# Patient Record
Sex: FEMALE | Race: WHITE | URBAN - METROPOLITAN AREA
[De-identification: names, ages, dates, MRNs, and addresses within clinical notes are randomized per-mention and may not be internally consistent; named-entity substitution may affect disease eponyms.]

---

## 2017-02-20 ENCOUNTER — OFFICE VISIT (OUTPATIENT)
Dept: FAMILY MEDICINE CLINIC | Age: 47
End: 2017-02-20

## 2017-02-20 VITALS
TEMPERATURE: 98.1 F | HEIGHT: 66 IN | SYSTOLIC BLOOD PRESSURE: 110 MMHG | DIASTOLIC BLOOD PRESSURE: 78 MMHG | HEART RATE: 74 BPM | WEIGHT: 161 LBS | BODY MASS INDEX: 25.88 KG/M2 | OXYGEN SATURATION: 99 %

## 2017-02-20 DIAGNOSIS — I73.9 PVD (PERIPHERAL VASCULAR DISEASE) (HCC): ICD-10-CM

## 2017-02-20 DIAGNOSIS — M54.2 CERVICALGIA: Primary | ICD-10-CM
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
## 2017-02-20 DIAGNOSIS — E04.1 THYROID NODULE: ICD-10-CM

## 2017-02-20 PROCEDURE — 99213 OFFICE O/P EST LOW 20 MIN: CPT | Performed by: FAMILY MEDICINE

## 2017-02-20 RX ORDER — TRIAMTERENE AND HYDROCHLOROTHIAZIDE 37.5; 25 MG/1; MG/1
1 CAPSULE ORAL DAILY
Qty: 30 CAPSULE | Refills: 3 | Status: SHIPPED | OUTPATIENT
Start: 2017-02-20

## 2017-02-20 RX ORDER — NAPROXEN 500 MG/1
500 TABLET ORAL 2 TIMES DAILY WITH MEALS
Qty: 60 TABLET | Refills: 3 | Status: SHIPPED | OUTPATIENT
Start: 2017-02-20 | End: 2018-02-20

## 2017-02-20 RX ORDER — METHYLPREDNISOLONE 4 MG/1
TABLET ORAL
Qty: 1 KIT | Refills: 0 | Status: SHIPPED | OUTPATIENT
Start: 2017-02-20

## 2017-02-20 RX ORDER — METHOCARBAMOL 750 MG/1
750 TABLET, FILM COATED ORAL 4 TIMES DAILY
Qty: 40 TABLET | Refills: 0 | Status: SHIPPED | OUTPATIENT
Start: 2017-02-20 | End: 2017-03-02

## 2017-02-21 ASSESSMENT — ENCOUNTER SYMPTOMS
GASTROINTESTINAL NEGATIVE: 1
RESPIRATORY NEGATIVE: 1

## 2017-02-22 DIAGNOSIS — E04.1 THYROID NODULE: Primary | ICD-10-CM

## 2017-02-22 RX ORDER — BROMPHENIRAMINE MALEATE, PSEUDOEPHEDRINE HYDROCHLORIDE, AND DEXTROMETHORPHAN HYDROBROMIDE 2; 30; 10 MG/5ML; MG/5ML; MG/5ML
5 SYRUP ORAL 4 TIMES DAILY PRN
Qty: 240 ML | Refills: 0 | COMMUNITY
Start: 2017-02-22

## 2017-02-22 RX ORDER — BROMPHENIRAMINE MALEATE, PSEUDOEPHEDRINE HYDROCHLORIDE, AND DEXTROMETHORPHAN HYDROBROMIDE 2; 30; 10 MG/5ML; MG/5ML; MG/5ML
5 SYRUP ORAL 4 TIMES DAILY PRN
Qty: 240 ML | Refills: 0 | COMMUNITY
Start: 2017-02-22 | End: 2017-02-22 | Stop reason: SDUPTHER

## 2017-02-22 RX ORDER — CEFDINIR 300 MG/1
300 CAPSULE ORAL 2 TIMES DAILY
Qty: 20 CAPSULE | Refills: 0 | Status: SHIPPED | OUTPATIENT
Start: 2017-02-22 | End: 2017-03-04

## 2017-02-22 RX ORDER — CEFDINIR 300 MG/1
300 CAPSULE ORAL 2 TIMES DAILY
Qty: 20 CAPSULE | Refills: 0 | Status: SHIPPED | OUTPATIENT
Start: 2017-02-22 | End: 2017-02-22 | Stop reason: SDUPTHER

## 2017-02-24 ENCOUNTER — TELEPHONE (OUTPATIENT)
Dept: FAMILY MEDICINE CLINIC | Age: 47
End: 2017-02-24

## 2017-02-27 DIAGNOSIS — E04.1 THYROID NODULE: Primary | ICD-10-CM

## 2020-06-10 ENCOUNTER — HOSPITAL ENCOUNTER (OUTPATIENT)
Age: 50
Setting detail: SPECIMEN
Discharge: HOME | End: 2020-06-10
Payer: OTHER GOVERNMENT

## 2020-06-10 ENCOUNTER — OFFICE VISIT (OUTPATIENT)
Dept: PRIMARY CARE CLINIC | Age: 50
End: 2020-06-10

## 2020-06-10 PROCEDURE — 99211 OFF/OP EST MAY X REQ PHY/QHP: CPT | Performed by: FAMILY MEDICINE

## 2020-06-10 PROCEDURE — U0002 COVID-19 LAB TEST NON-CDC: HCPCS

## 2020-06-11 LAB
SARS-COV-2: NOT DETECTED
SOURCE: NORMAL

## 2020-06-19 VITALS — TEMPERATURE: 98.1 F | HEART RATE: 74 BPM | OXYGEN SATURATION: 99 %

## 2020-08-28 ENCOUNTER — HOSPITAL ENCOUNTER (OUTPATIENT)
Dept: MAMMOGRAPHY | Age: 50
Discharge: HOME | End: 2020-08-28
Payer: COMMERCIAL

## 2020-08-28 PROCEDURE — 77063 BREAST TOMOSYNTHESIS BI: CPT

## 2020-12-04 ENCOUNTER — HOSPITAL ENCOUNTER (OUTPATIENT)
Age: 50
Setting detail: SPECIMEN
Discharge: HOME | End: 2020-12-04

## 2020-12-04 ENCOUNTER — OFFICE VISIT (OUTPATIENT)
Dept: PRIMARY CARE CLINIC | Age: 50
End: 2020-12-04

## 2020-12-04 PROCEDURE — 99213 OFFICE O/P EST LOW 20 MIN: CPT | Performed by: PHYSICIAN ASSISTANT

## 2020-12-04 PROCEDURE — U0002 COVID-19 LAB TEST NON-CDC: HCPCS

## 2020-12-04 NOTE — PROGRESS NOTES
12/4/20  Demi Leal  1970    FLU/COVID-19 CLINIC EVALUATION    HPI SYMPTOMS:    Employer:    [] Fevers  [] Chills  [x] Cough  [] Coughing up blood  [] Chest Congestion  [x] Nasal Congestion  [x] Feeling short of breath  [x] Sometimes  [] Frequently  [] All the time  [] Chest pain  [x] Headaches  []Tolerable  [] Severe  [x] Sore throat  [] Muscle aches  [] Nausea  [] Vomiting  []Unable to keep fluids down  [] Diarrhea  []Severe    [] OTHER SYMPTOMS:      Symptom Duration:   [x] 1  [] 2   [] 3   [] 4    [] 5   [] 6   [] 7   [] 8   [] 9   [] 10   [] 11   [] 12   [] 13   [] 14   [] Longer than 14 days    Symptom course:   [x] Worsening     [] Stable     [] Improving    RISK FACTORS:    [] Pregnant or possibly pregnant  [] Age over 61  [] Diabetes  [] Heart disease  [] Asthma  [] COPD/Other chronic lung diseases  [] Active Cancer  [] On Chemotherapy  [] Taking oral steroids  [] History Lymphoma/Leukemia  [] Close contact with a lab confirmed COVID-19 patient within 14 days of symptom onset  [] History of travel from affected geographical areas within 14 days of symptom onset       VITALS:  There were no vitals filed for this visit. TESTS:    POCT FLU:  [] Positive     []Negative    ASSESSMENT:    [] Flu  [] Possible COVID-19  [] Strep    PLAN:    [] Discharge home with written instructions for:  [] Flu management  [] Possible COVID-19 infection with self-quarantine and management of symptoms  [] Follow-up with primary care physician or emergency department if worsens  [] Evaluation per physician/NP/PA in clinic  [] Sent to ER       An  electronic signature was used to authenticate this note.      --Sukh Sanderson LPN on 26/8/2949 at 4:74 PM

## 2020-12-04 NOTE — PROGRESS NOTES
12/4/2020    HPI:  Chief complaint and history of present illness as per medical assistant/nurse documented today in the Flu/COVID-19 clinic. Patient states she woke up this morning with postnasal drip, runny nose, cough, shortness of breath, headache, and sore throat. She denies stridor, wheezing, chest pain, fever, chills, muscle aches, nausea, vomiting, diarrhea. She has taken Excedrin and it has relieved her headache. She denies worst headache of her life. She denies neck pain or stiffness, lightheadedness, dizziness, light sensitivity or vision changes. She works for [employer]. No known contact with COVID-19. There are other ill contacts in her workplace who have yet to be tested for Covid. MEDICATIONS:  Prior to Visit Medications    Medication Sig Taking? Authorizing Provider   dicyclomine (BENTYL) 10 MG capsule Take 1 capsule by mouth 4 times daily (before meals and nightly)  Chuckie Euceda [address] [place] [address], BETH   brompheniramine-pseudoephedrine-DM (BROMFED DM) 30-2-10 MG/5ML syrup Take 5 mLs by mouth 4 times daily as needed for Congestion or Cough  [facility]   naproxen (EC-NAPROSYN) 500 MG EC tablet Take 1 tablet by mouth 2 times daily (with meals)  [facility]   methylPREDNISolone (MEDROL, WILLIAM,) 4 MG tablet Take as directed on the pack  [facility] PA-C   triamterene-hydrochlorothiazide (DYAZIDE) 37.5-25 MG per capsule Take 1 capsule by mouth daily  [facility] PA-C   Compression Stockings MISC by Does not apply route  [facility]   estradiol (VIVELLE-DOT) 0.1 MG/24HR Place 2 patches onto the skin twice a week. Historical Provider, MD   ibuprofen (ADVIL) 200 MG CAPS Take 1 capsule by mouth as needed.     Historical Provider, MD       Allergies   Allergen Reactions    Tequin [Gatifloxacin-D5w] Other (See Comments)     Reaction was hypoglycemia   ,   Past Medical History:   Diagnosis Date    Edema     Hands, Lower Extremities frequently    Nausea & vomiting Severe    Swelling, lymph nodes 10/11    Lymphadema bilateral neck       PHYSICAL EXAM:  Physical Exam  Temp:  97.6 F  Heart Rate:  87    Pulse Ox:  98%    Constitutional:  Well developed, well nourished  HENT:  Normocephalic, atraumatic, bilateral external ears normal, bilateral TMs normal, oropharynx moist, post nasal drip noted, no petechiae or exudate, airway patent, sinuses nontender, clear rhinorrhea  Eyes:  conjunctiva normal, no discharge, no scleral icterus  Cardiovascular:  Normal heart rate, normal rhythm, no murmurs, gallops or rubs  Thorax & Lungs:  Normal breath sounds, no respiratory distress, no wheezing  Skin:  Warm, dry, no erythema, no rash  Neurologic:  Alert & oriented   Psychiatric:  Affect normal, mood normal    ASSESSMENT/PLAN:  1. Flu-like symptoms  - COVID-19 Ambulatory    Patient was encouraged to rest and stay well-hydrated. Gargle warm salt water as needed. Continue over-the-counter medications as needed for symptom relief. We discussed my chart and the importance of isolating at home. She understands she should go to the emergency room for any sudden changes. FOLLOW-UP:  No follow-ups on file.      In addition to other information, the printed after visit summary provided to the patient includes:  [x] COVID-19 Self care instructions  [x] COVID-19 General patient information    [facility]

## 2020-12-07 LAB
SARS-COV-2: DETECTED
SOURCE: ABNORMAL

## 2020-12-16 ENCOUNTER — APPOINTMENT (OUTPATIENT)
Dept: CT IMAGING | Age: 50
End: 2020-12-16

## 2020-12-16 ENCOUNTER — HOSPITAL ENCOUNTER (EMERGENCY)
Age: 50
Discharge: HOME | End: 2020-12-16
Attending: EMERGENCY MEDICINE

## 2020-12-16 VITALS
DIASTOLIC BLOOD PRESSURE: 79 MMHG | BODY MASS INDEX: 24.43 KG/M2 | RESPIRATION RATE: 12 BRPM | TEMPERATURE: 98.1 F | OXYGEN SATURATION: 98 % | WEIGHT: 152 LBS | SYSTOLIC BLOOD PRESSURE: 109 MMHG | HEART RATE: 70 BPM | HEIGHT: 66 IN

## 2020-12-16 LAB
ALBUMIN SERPL-MCNC: 4 GM/DL (ref 3.4–5)
ALP BLD-CCNC: 79 IU/L (ref 40–129)
ALT SERPL-CCNC: 19 U/L (ref 10–40)
ANION GAP SERPL CALCULATED.3IONS-SCNC: 10 MMOL/L (ref 4–16)
AST SERPL-CCNC: 18 IU/L (ref 15–37)
BASE EXCESS MIXED: 2.8 (ref 0–2.3)
BASOPHILS ABSOLUTE: 0.1 K/CU MM
BASOPHILS RELATIVE PERCENT: 0.7 % (ref 0–1)
BILIRUB SERPL-MCNC: 0.2 MG/DL (ref 0–1)
BUN BLDV-MCNC: 17 MG/DL (ref 6–23)
CALCIUM SERPL-MCNC: 9.3 MG/DL (ref 8.3–10.6)
CHLORIDE BLD-SCNC: 96 MMOL/L (ref 99–110)
CO2: 25 MMOL/L (ref 21–32)
COMMENT: ABNORMAL
CREAT SERPL-MCNC: 0.6 MG/DL (ref 0.6–1.1)
D DIMER: <200 NG/ML(DDU)
DIFFERENTIAL TYPE: ABNORMAL
EOSINOPHILS ABSOLUTE: 0.2 K/CU MM
EOSINOPHILS RELATIVE PERCENT: 2.2 % (ref 0–3)
GFR AFRICAN AMERICAN: >60 ML/MIN/1.73M2
GFR NON-AFRICAN AMERICAN: >60 ML/MIN/1.73M2
GLUCOSE BLD-MCNC: 94 MG/DL (ref 70–99)
HCG QUALITATIVE: NEGATIVE
HCO3 VENOUS: 27.2 MMOL/L (ref 19–25)
HCT VFR BLD CALC: 48.1 % (ref 37–47)
HEMOGLOBIN: 15.6 GM/DL (ref 12.5–16)
IMMATURE NEUTROPHIL %: 0.4 % (ref 0–0.43)
LACTATE: 1 MMOL/L (ref 0.4–2)
LYMPHOCYTES ABSOLUTE: 1.9 K/CU MM
LYMPHOCYTES RELATIVE PERCENT: 28.4 % (ref 24–44)
MCH RBC QN AUTO: 28.2 PG (ref 27–31)
MCHC RBC AUTO-ENTMCNC: 32.4 % (ref 32–36)
MCV RBC AUTO: 87 FL (ref 78–100)
MONOCYTES ABSOLUTE: 0.5 K/CU MM
MONOCYTES RELATIVE PERCENT: 7 % (ref 0–4)
NUCLEATED RBC %: 0 %
O2 SAT, VEN: 95 % (ref 50–70)
PCO2, VEN: 40 MMHG (ref 38–52)
PDW BLD-RTO: 12 % (ref 11.7–14.9)
PH VENOUS: 7.44 (ref 7.32–7.42)
PLATELET # BLD: 307 K/CU MM (ref 140–440)
PMV BLD AUTO: 10.6 FL (ref 7.5–11.1)
PO2, VEN: 102 MMHG (ref 28–48)
POTASSIUM SERPL-SCNC: 3.7 MMOL/L (ref 3.5–5.1)
PRO-BNP: 15.16 PG/ML
RBC # BLD: 5.53 M/CU MM (ref 4.2–5.4)
SEGMENTED NEUTROPHILS ABSOLUTE COUNT: 4.1 K/CU MM
SEGMENTED NEUTROPHILS RELATIVE PERCENT: 61.3 % (ref 36–66)
SODIUM BLD-SCNC: 131 MMOL/L (ref 135–145)
TOTAL IMMATURE NEUTOROPHIL: 0.03 K/CU MM
TOTAL NUCLEATED RBC: 0 K/CU MM
TOTAL PROTEIN: 7.1 GM/DL (ref 6.4–8.2)
TROPONIN T: <0.01 NG/ML
WBC # BLD: 6.7 K/CU MM (ref 4–10.5)

## 2020-12-16 PROCEDURE — 85379 FIBRIN DEGRADATION QUANT: CPT

## 2020-12-16 PROCEDURE — 70450 CT HEAD/BRAIN W/O DYE: CPT

## 2020-12-16 PROCEDURE — 93005 ELECTROCARDIOGRAM TRACING: CPT | Performed by: EMERGENCY MEDICINE

## 2020-12-16 PROCEDURE — 84703 CHORIONIC GONADOTROPIN ASSAY: CPT

## 2020-12-16 PROCEDURE — 83880 ASSAY OF NATRIURETIC PEPTIDE: CPT

## 2020-12-16 PROCEDURE — 2580000003 HC RX 258: Performed by: EMERGENCY MEDICINE

## 2020-12-16 PROCEDURE — 83605 ASSAY OF LACTIC ACID: CPT

## 2020-12-16 PROCEDURE — 96374 THER/PROPH/DIAG INJ IV PUSH: CPT

## 2020-12-16 PROCEDURE — 99284 EMERGENCY DEPT VISIT MOD MDM: CPT

## 2020-12-16 PROCEDURE — 80053 COMPREHEN METABOLIC PANEL: CPT

## 2020-12-16 PROCEDURE — 82805 BLOOD GASES W/O2 SATURATION: CPT

## 2020-12-16 PROCEDURE — 84484 ASSAY OF TROPONIN QUANT: CPT

## 2020-12-16 PROCEDURE — 96375 TX/PRO/DX INJ NEW DRUG ADDON: CPT

## 2020-12-16 PROCEDURE — 6360000002 HC RX W HCPCS: Performed by: EMERGENCY MEDICINE

## 2020-12-16 PROCEDURE — 85025 COMPLETE CBC W/AUTO DIFF WBC: CPT

## 2020-12-16 RX ORDER — DIPHENHYDRAMINE HYDROCHLORIDE 50 MG/ML
25 INJECTION INTRAMUSCULAR; INTRAVENOUS ONCE
Status: COMPLETED | OUTPATIENT
Start: 2020-12-16 | End: 2020-12-16

## 2020-12-16 RX ORDER — 0.9 % SODIUM CHLORIDE 0.9 %
1000 INTRAVENOUS SOLUTION INTRAVENOUS ONCE
Status: COMPLETED | OUTPATIENT
Start: 2020-12-16 | End: 2020-12-16

## 2020-12-16 RX ORDER — ONDANSETRON 4 MG/1
4 TABLET, ORALLY DISINTEGRATING ORAL 3 TIMES DAILY PRN
Qty: 21 TABLET | Refills: 0 | Status: SHIPPED | OUTPATIENT
Start: 2020-12-16

## 2020-12-16 RX ORDER — DIPHENHYDRAMINE HCL 25 MG
25 CAPSULE ORAL EVERY 6 HOURS PRN
Qty: 30 CAPSULE | Refills: 0 | Status: SHIPPED | OUTPATIENT
Start: 2020-12-16 | End: 2020-12-26

## 2020-12-16 RX ORDER — METOCLOPRAMIDE HYDROCHLORIDE 5 MG/ML
10 INJECTION INTRAMUSCULAR; INTRAVENOUS ONCE
Status: COMPLETED | OUTPATIENT
Start: 2020-12-16 | End: 2020-12-16

## 2020-12-16 RX ADMIN — METOCLOPRAMIDE 10 MG: 5 INJECTION, SOLUTION INTRAMUSCULAR; INTRAVENOUS at 18:18

## 2020-12-16 RX ADMIN — DIPHENHYDRAMINE HYDROCHLORIDE 25 MG: 50 INJECTION INTRAMUSCULAR; INTRAVENOUS at 18:18

## 2020-12-16 RX ADMIN — SODIUM CHLORIDE 1000 ML: 9 INJECTION, SOLUTION INTRAVENOUS at 18:21

## 2020-12-16 ASSESSMENT — PAIN SCALES - GENERAL: PAINLEVEL_OUTOF10: 7

## 2020-12-16 ASSESSMENT — PAIN DESCRIPTION - LOCATION: LOCATION: HEAD;CHEST

## 2020-12-16 ASSESSMENT — PAIN DESCRIPTION - PAIN TYPE: TYPE: ACUTE PAIN

## 2020-12-16 NOTE — ED PROVIDER NOTES
Emergency Department Encounter    Patient: Errol Junior  MRN: [id number]  : 1970  Date of Evaluation: 2020  ED Provider:  JONE ROBLES    Triage Chief Complaint:   Nausea, Emesis, Shortness of Breath, and Migraine      Lummi:  Errol Junior is a 53 y.o. female that presents to the emergency department with a constellation of symptoms beginning with a \"migraine\" yesterday. Sometime yesterday afternoon, patient had the abrupt onset and progression of a frontal and generalized headache. She thinks it was an \"ocular migraine,\" because she lost vision out of the right eye briefly. Vision has returned, but the headache persists. She does report light and sound sensitivity. She has been very nauseated. She denies any recent vomiting or diarrhea. Patient reports a history of TIA in 2020, though that involved symptoms of the right arm and leg. She has had no extremity symptoms with this current episode. Patient states her symptoms is much more consistent with episodes of low potassium and calcium. She tried eating a banana, but this has not helped. Patient was tested positive for COVID-19 on 2020. She reports some sharp discomfort just below the left collarbone with inspiration. There is a persistent dry cough. She denies other chest pain. She has been managing fairly well at home otherwise. Patient reports no other particular provocative or alleviating factors. ROS - see HPI, below listed is current ROS at time of my eval:  CONSTITUTIONAL: No fevers, chills, or sweats. EYES: No vision change, redness, drainage, or discharge. HENT: No sore throat, runny nose, or earache. No dental pain. No painful swallowing. RESPIRATORY: No difficulty breathing, cough, or sputum production. CARDIOVASCULAR: No anginal-type chest pain, orthopnea, or edema. GASTROINTESTINAL: No abdominal pain. No diarrhea or constipation. No hematemesis, hematochezia, or melena.   GENITOURINARY: No frequency, urgency, or dysuria. No hematuria. MUSCULOSKELETAL: No recent injury. No neck, back, or extremity pain. NEUROLOGICAL: No focal weakness, numbness, or tingling. SKIN: No rashes or other lesions reported. No yellowing of the skin.       Past Medical History:   Diagnosis Date    Edema     Hands, Lower Extremities frequently    Hypokalemia 06/2020    Nausea & vomiting     Severe    Swelling, lymph nodes 10/11    Lymphadema bilateral neck    TIA (transient ischemic attack) 06/2020     Past Surgical History:   Procedure Laterality Date    ABDOMEN SURGERY  1986    Exploratory Laparotomy    CHOLECYSTECTOMY      DENTAL SURGERY  1978    Caps On Teeth    DILATION AND CURETTAGE OF UTERUS      X 4 in Past, Last One 2007    HERNIA REPAIR      HYSTERECTOMY  8/11    Robotic Lap Total Hysterectomy    LAPAROSCOPY      X3 Last one 2007, laser     TONSILLECTOMY  1977    T & A    TUBAL LIGATION  2007     Family History   Problem Relation Age of Onset    Arthritis Mother     Hearing Loss Mother     High Blood Pressure Mother     High Cholesterol Mother     Other Mother 48        Subdurmal Hematoma    Heart Disease Mother         CAD    Cancer Father 61        Renal Cell     Diabetes Father     High Cholesterol Father     Asthma Son     Other Son         GERD    Asthma Son     Other Son         Bronchomalacia Tracheomalacia     Social History     Socioeconomic History    Marital status:      Spouse name: Davi Armando Number of children: 2    Years of education: Not on file    Highest education level: Not on file   Occupational History    Occupation: LPN   Social Needs    Financial resource strain: Not on file    Food insecurity     Worry: Not on file     Inability: Not on file    Transportation needs     Medical: Not on file     Non-medical: Not on file   Tobacco Use    Smoking status: Former Smoker     Quit date: 4/4/2005     Years since quitting: 15.7    Smokeless tobacco: Never Used   Substance and Sexual Activity    Alcohol use: Yes     Comment: socially    Drug use: No    Sexual activity: Not on file   Lifestyle    Physical activity     Days per week: Not on file     Minutes per session: Not on file    Stress: Not on file   Relationships    Social connections     Talks on phone: Not on file     Gets together: Not on file     Attends Synagogue service: Not on file     Active member of club or organization: Not on file     Attends meetings of clubs or organizations: Not on file     Relationship status: Not on file    Intimate partner violence     Fear of current or ex partner: Not on file     Emotionally abused: Not on file     Physically abused: Not on file     Forced sexual activity: Not on file   Other Topics Concern    Not on file   Social History Narrative    Do you donate blood or plasma? No    Caffeine intake? 2 cups daily    Advance directive? No    Is blood transfusion acceptable in an emergency? Yes             No current facility-administered medications for this encounter.       Current Outpatient Medications   Medication Sig Dispense Refill    ondansetron (ZOFRAN-ODT) 4 MG disintegrating tablet Take 1 tablet by mouth 3 times daily as needed for Nausea or Vomiting (Headache) 21 tablet 0    diphenhydrAMINE (BENADRYL ALLERGY) 25 MG capsule Take 1 capsule by mouth every 6 hours as needed for Itching (Headache, nausea and vomiting) 30 capsule 0    dicyclomine (BENTYL) 10 MG capsule Take 1 capsule by mouth 4 times daily (before meals and nightly) 15 capsule 3    brompheniramine-pseudoephedrine-DM (BROMFED DM) 30-2-10 MG/5ML syrup Take 5 mLs by mouth 4 times daily as needed for Congestion or Cough 240 mL 0    naproxen (EC-NAPROSYN) 500 MG EC tablet Take 1 tablet by mouth 2 times daily (with meals) 60 tablet 3    methylPREDNISolone (MEDROL, WILLIAM,) 4 MG tablet Take as directed on the pack 1 kit 0    triamterene-hydrochlorothiazide (DYAZIDE) 37.5-25 MG per capsule Take 1 tenderness. CARDIOVASCULAR: Regular rate and rhythm. No audible murmurs, rubs, or gallops. No central or peripheral cyanosis. GASTROINTESTINAL: Soft, nontender, and nondistended. No McBurney's or Sanchez's point tenderness. No guarding, rebound, rigidity. No mass or pulsatile mass. Bowel sounds are present in all quadrants. No costovertebral angle tenderness. NEUROLOGICAL: Cranial nerves III through XII are grossly intact as tested without facial droop or dermatomal paresthesias. Of note, forehead wrinkles are symmetric and intact. Conjugate gaze without entrapment. No asymmetry of the corners of the mouth or nasolabial folds. Motor exhibits 5/5 strength in the bilateral trapezius, biceps, triceps, handgrip, quadriceps, psoas, dorsiflexors, and plantar flexors. No gross dermatomal paresthesias. No gross deficits of the cerebellum. MUSCULOSKELETAL: No asymmetric edema, Homans' sign, or cords. No tenderness or limitation range of motion to the bilateral shoulders, elbows, wrists, hips, knees, or ankles. No accompanying long bone tenderness or deformity. SKIN: Normal tone for ethnicity. Normal turgor and brisk capillary refill peripherally. No petechiae, purpura, vesicles, bullae, or other lesions. No icterus. PSYCHIATRIC: Normal mood. Normal affect. No voiced suicidal or homicidal ideation. Patient does not respond to internal stimuli. Emergency department course. Patient is brought to bed 14 and assessed and reassessed by me. Prior to initial evaluation, orders are placed for medical screening studies including CBC, metabolic panel, troponin, first lactate, and venous blood gas. Patient is noted to have been Covid positive on December 4, 2020. IV line is requested along with metoclopramide 10 mg and diphenhydramine 25 mg. After initial evaluation, additional orders are placed for CT of the head due to the persistent headache.   Patient exhibits no meningeal findings or focal neurological deficits. NIH stroke scale is 0. She is saturating at 95% on room air. Patient is agreeable to continuing plan. Upon most recent reevaluation, patient remains clinically stable. Headache remains, though it has improved. There are no developing meningeal findings or focal neurological deficits. There has been no respiratory distress, hypoxia, or cyanosis. As she has not had specific worsening or respiratory complaints, further imaging is deferred. We have discussed options for disposition, and with a generally reassuring evaluation, further outpatient management appears reasonable. Patient would like to be discharged home, as well. We have discussed all available results. Patient is satisfied with evaluation and agreeable to recommendations. Patient has had the opportunity to ask questions, and they have been answered to the best of my ability. Instructions are given to follow-up with primary care provider for reevaluation and further testing. Very strict return and follow-up instructions are provided. Patient seen during [facility], I did don appropriate PPE during my encounters with the patient, including n95 (when appropriate) mask and eye protection as appropriate.     I have reviewed and interpreted all of the currently available lab results from this visit (if applicable):  Results for orders placed or performed during the hospital encounter of 12/16/20   CBC Auto Differential   Result Value Ref Range    WBC 6.7 4.0 - 10.5 K/CU MM    RBC 5.53 (H) 4.2 - 5.4 M/CU MM    Hemoglobin 15.6 12.5 - 16.0 GM/DL    Hematocrit 48.1 (H) 37 - 47 %    MCV 87.0 78 - 100 FL    MCH 28.2 27 - 31 PG    MCHC 32.4 32.0 - 36.0 %    RDW 12.0 11.7 - 14.9 %    Platelets 686 291 - 781 K/CU MM    MPV 10.6 7.5 - 11.1 FL    Differential Type AUTOMATED DIFFERENTIAL     Segs Relative 61.3 36 - 66 %    Lymphocytes % 28.4 24 - 44 %    Monocytes % 7.0 (H) 0 - 4 %    Eosinophils % 2.2 0 - 3 %    Basophils % 0.7 0 - 1 %    Segs Absolute 4.1 K/CU MM    Lymphocytes Absolute 1.9 K/CU MM    Monocytes Absolute 0.5 K/CU MM    Eosinophils Absolute 0.2 K/CU MM    Basophils Absolute 0.1 K/CU MM    Nucleated RBC % 0.0 %    Total Nucleated RBC 0.0 K/CU MM    Total Immature Neutrophil 0.03 K/CU MM    Immature Neutrophil % 0.4 0 - 0.43 %   Comprehensive Metabolic Panel w/ Reflex to MG   Result Value Ref Range    Sodium 131 (L) 135 - 145 MMOL/L    Potassium 3.7 3.5 - 5.1 MMOL/L    Chloride 96 (L) 99 - 110 mMol/L    CO2 25 21 - 32 MMOL/L    BUN 17 6 - 23 MG/DL    CREATININE 0.6 0.6 - 1.1 MG/DL    Glucose 94 70 - 99 MG/DL    Calcium 9.3 8.3 - 10.6 MG/DL    Alb 4.0 3.4 - 5.0 GM/DL    Total Protein 7.1 6.4 - 8.2 GM/DL    Total Bilirubin 0.2 0.0 - 1.0 MG/DL    ALT 19 10 - 40 U/L    AST 18 15 - 37 IU/L    Alkaline Phosphatase 79 40 - 129 IU/L    GFR Non-African American >60 >60 mL/min/1.73m2    GFR African American >60 >60 mL/min/1.73m2    Anion Gap 10 4 - 16   Troponin   Result Value Ref Range    Troponin T <0.010 <0.01 NG/ML   Brain Natriuretic Peptide   Result Value Ref Range    Pro-BNP 15.16 <300 PG/ML   D-Dimer, Quantitative   Result Value Ref Range    D-Dimer, Quant <200 <230 NG/mL(DDU)   Blood Gas, Venous   Result Value Ref Range    pH, Carlos 7.44 (H) 7.32 - 7.42    pCO2, Carlos 40 38 - 52 mmHG    pO2, Carlos 102 (H) 28 - 48 mmHG    Base Exc, Mixed 2.8 (H) 0 - 2.3    HCO3, Venous 27.2 (H) 19 - 25 MMOL/L    O2 Sat, Carlos 95.0 (H) 50 - 70 %    Comment VBG    Lactic Acid, Plasma   Result Value Ref Range    Lactate 1.0 0.4 - 2.0 mMOL/L   HCG Qualitative, Serum   Result Value Ref Range    hCG Qual NEGATIVE    EKG 12 Lead   Result Value Ref Range    Ventricular Rate 80 BPM    Atrial Rate 80 BPM    P-R Interval 124 ms    QRS Duration 84 ms    Q-T Interval 358 ms    QTc Calculation (Bazett) 412 ms    P Axis 66 degrees    R Axis 77 degrees    T Axis 60 degrees    Diagnosis       Normal sinus rhythm  Possible Left atrial enlargement  Nonspecific T normal sinus rhythm. Rate and intervals are 80 beats per minute, WA interval 124 milliseconds, QRS duration 84 milliseconds, QTc interval 412 milliseconds, and R axis normal at the 77 degrees. There are no old EKGs available for comparison. Medical decision making:  Patient presents to the emergency department with signs and symptoms consistent with an ongoing viral syndrome associated with a recent COVID-19 diagnosis. She reports headache. There are no meningeal findings, focal neurological deficits, fevers, or behavioral changes to point towards high risk of meningitis, encephalitis, cerebritis, or subarachnoid hemorrhage. Consider lumbar puncture, but procedure has been declined after discussion of potential risks. White blood cell count, differential, and D-dimer appear quite reassuring. There has been no respiratory distress, hypoxia, and cyanosis even on room air. Abdomen has been nonsurgical.  With clinical stability and adequate saturations, admission to the hospital or further testing this evening does not appear specifically indicated. We have discussed further outpatient management, and the patient is agreeable. Procedures: None. Consultations: None. Clinical Impression:  1. Viral cephalgia    2. COVID-19 virus infection    3.  Non-intractable vomiting with nausea, unspecified vomiting type      Disposition referral (if applicable):  B[facility]ier [address]  [phone]    Schedule an appointment as soon as possible for a visit       [address] [place] [address] Emergency Department  [place] [id number] [phone]  Go to   As needed, If symptoms worsen    Disposition medications (if applicable):  Discharge Medication List as of 12/16/2020  8:09 PM      START taking these medications    Details   ondansetron (ZOFRAN-ODT) 4 MG disintegrating tablet Take 1 tablet by mouth 3 times daily as needed for Nausea or Vomiting (Headache), Disp-21 tablet, R-0Print      diphenhydrAMINE (BENADRYL ALLERGY) 25 MG capsule Take 1 capsule by mouth every 6 hours as needed for Itching (Headache, nausea and vomiting), Disp-30 capsule, R-0Print           ED Provider Disposition Time  DISPOSITION Decision To Discharge 12/16/2020 07:45:43 PM      Comment: Please note this report has been produced using speech recognition software and may contain errors related to that system including errors in grammar, punctuation, and spelling, as well as words and phrases that may be inappropriate. Efforts were made to edit the dictations.         Perla Vega MD  12/17/20 [id number]

## 2020-12-16 NOTE — ED NOTES
Bed: ED-14  Expected date:   Expected time:   Means of arrival:   Comments:  Needs alan at [facility]  12/16/20 0766

## 2020-12-17 ENCOUNTER — CARE COORDINATION (OUTPATIENT)
Dept: CARE COORDINATION | Age: 50
End: 2020-12-17

## 2020-12-17 PROCEDURE — 93010 ELECTROCARDIOGRAM REPORT: CPT | Performed by: INTERNAL MEDICINE

## 2020-12-17 NOTE — CARE COORDINATION
Call to check on pt status after recent ER visit for migraine, cough, nausea. Pt is known COVID+. LM with ACM contact information & requested a call back. CHARLIE OjedaN RN  Ambulatory Care Manager  [phone] office/cell  [phone] fax  [email or web address]

## 2020-12-18 ENCOUNTER — CARE COORDINATION (OUTPATIENT)
Dept: CARE COORDINATION | Age: 50
End: 2020-12-18

## 2020-12-18 NOTE — CARE COORDINATION
Call to check on pt status after recent ER visit for migraine, cough, nausea. Pt is known COVID+. LM with ACM contact information & requested a call back. 2nd attempt, no further outreach is planned. CHARLIE MontanoN RN  Ambulatory Care Manager  [phone] office/cell  [phone] fax  [email or web address]

## 2020-12-22 LAB
EKG ATRIAL RATE: 80 BPM
EKG DIAGNOSIS: NORMAL
EKG P AXIS: 66 DEGREES
EKG P-R INTERVAL: 124 MS
EKG Q-T INTERVAL: 358 MS
EKG QRS DURATION: 84 MS
EKG QTC CALCULATION (BAZETT): 412 MS
EKG R AXIS: 77 DEGREES
EKG T AXIS: 60 DEGREES
EKG VENTRICULAR RATE: 80 BPM

## 2022-06-02 ENCOUNTER — HOSPITAL ENCOUNTER (OUTPATIENT)
Age: 52
Setting detail: SPECIMEN
Discharge: HOME | End: 2022-06-02

## 2022-06-02 PROCEDURE — 88142 CYTOPATH C/V THIN LAYER: CPT

## 2023-06-23 ENCOUNTER — TELEPHONE (OUTPATIENT)
Dept: GASTROENTEROLOGY | Age: 53
End: 2023-06-23

## 2023-06-30 ENCOUNTER — TELEPHONE (OUTPATIENT)
Dept: GASTROENTEROLOGY | Age: 53
End: 2023-06-30

## 2023-07-03 ENCOUNTER — HOSPITAL ENCOUNTER (OUTPATIENT)
Age: 53
Discharge: HOME | End: 2023-07-03
Payer: COMMERCIAL

## 2023-07-03 ENCOUNTER — HOSPITAL ENCOUNTER (OUTPATIENT)
Dept: ULTRASOUND IMAGING | Age: 53
Discharge: HOME | End: 2023-07-03
Payer: COMMERCIAL

## 2023-07-03 ENCOUNTER — HOSPITAL ENCOUNTER (OUTPATIENT)
Dept: GENERAL RADIOLOGY | Age: 53
Discharge: HOME | End: 2023-07-03
Payer: COMMERCIAL

## 2023-07-03 ENCOUNTER — TRANSCRIBE ORDERS (OUTPATIENT)
Dept: ADMINISTRATIVE | Age: 53
End: 2023-07-03

## 2023-07-03 DIAGNOSIS — M89.8X8 MASS OF STERNUM: ICD-10-CM

## 2023-07-03 DIAGNOSIS — M89.8X8 MASS OF STERNUM: Primary | ICD-10-CM

## 2023-07-03 PROCEDURE — 71046 X-RAY EXAM CHEST 2 VIEWS: CPT

## 2023-07-03 PROCEDURE — 76604 US EXAM CHEST: CPT

## 2023-07-07 ENCOUNTER — TELEPHONE (OUTPATIENT)
Dept: GASTROENTEROLOGY | Age: 53
End: 2023-07-07

## 2023-07-07 NOTE — TELEPHONE ENCOUNTER
Called pt. For the 3rd time In regards to a referral for a colon screening.  LM for pt to call back to make an appt

## 2023-07-19 ENCOUNTER — HOSPITAL ENCOUNTER (OUTPATIENT)
Dept: ULTRASOUND IMAGING | Age: 53
Discharge: HOME | End: 2023-07-19
Payer: COMMERCIAL

## 2023-07-19 DIAGNOSIS — E04.9 THYROID ENLARGEMENT: ICD-10-CM

## 2023-07-19 PROCEDURE — 76536 US EXAM OF HEAD AND NECK: CPT

## 2023-09-07 ENCOUNTER — APPOINTMENT (OUTPATIENT)
Dept: CT IMAGING | Age: 53
DRG: 660 | End: 2023-09-07
Payer: COMMERCIAL

## 2023-09-07 ENCOUNTER — ANESTHESIA (OUTPATIENT)
Dept: OPERATING ROOM | Age: 53
End: 2023-09-07
Payer: COMMERCIAL

## 2023-09-07 ENCOUNTER — ANESTHESIA EVENT (OUTPATIENT)
Dept: OPERATING ROOM | Age: 53
End: 2023-09-07
Payer: COMMERCIAL

## 2023-09-07 ENCOUNTER — HOSPITAL ENCOUNTER (INPATIENT)
Age: 53
LOS: 1 days | Discharge: HOME | DRG: 660 | End: 2023-09-08
Attending: EMERGENCY MEDICINE | Admitting: INTERNAL MEDICINE
Payer: COMMERCIAL

## 2023-09-07 ENCOUNTER — APPOINTMENT (OUTPATIENT)
Dept: GENERAL RADIOLOGY | Age: 53
DRG: 660 | End: 2023-09-07
Payer: COMMERCIAL

## 2023-09-07 DIAGNOSIS — N20.1 LEFT URETERAL STONE: ICD-10-CM

## 2023-09-07 DIAGNOSIS — R11.2 NAUSEA AND VOMITING, UNSPECIFIED VOMITING TYPE: Primary | ICD-10-CM

## 2023-09-07 DIAGNOSIS — N13.8 URINARY TRACT OBSTRUCTION BY KIDNEY STONE: ICD-10-CM

## 2023-09-07 DIAGNOSIS — N20.0 KIDNEY STONE: ICD-10-CM

## 2023-09-07 DIAGNOSIS — E87.20 LACTIC ACIDOSIS: ICD-10-CM

## 2023-09-07 DIAGNOSIS — N20.0 URINARY TRACT OBSTRUCTION BY KIDNEY STONE: ICD-10-CM

## 2023-09-07 LAB
ALBUMIN SERPL-MCNC: 4.1 GM/DL (ref 3.4–5)
ALP BLD-CCNC: 75 IU/L (ref 40–128)
ALT SERPL-CCNC: 13 U/L (ref 10–40)
ANION GAP SERPL CALCULATED.3IONS-SCNC: 16 MMOL/L (ref 4–16)
AST SERPL-CCNC: 14 IU/L (ref 15–37)
BACTERIA: NEGATIVE /HPF
BASOPHILS ABSOLUTE: 0.1 K/CU MM
BASOPHILS RELATIVE PERCENT: 0.6 % (ref 0–1)
BILIRUB SERPL-MCNC: 0.2 MG/DL (ref 0–1)
BILIRUBIN URINE: NEGATIVE MG/DL
BLOOD, URINE: ABNORMAL
BUN SERPL-MCNC: 16 MG/DL (ref 6–23)
CALCIUM SERPL-MCNC: 9.5 MG/DL (ref 8.3–10.6)
CHLORIDE BLD-SCNC: 100 MMOL/L (ref 99–110)
CLARITY: ABNORMAL
CO2: 24 MMOL/L (ref 21–32)
COLOR: YELLOW
CREAT SERPL-MCNC: 0.9 MG/DL (ref 0.6–1.1)
DIFFERENTIAL TYPE: ABNORMAL
EOSINOPHILS ABSOLUTE: 0.5 K/CU MM
EOSINOPHILS RELATIVE PERCENT: 4.3 % (ref 0–3)
GFR SERPL CREATININE-BSD FRML MDRD: >60 ML/MIN/1.73M2
GLUCOSE SERPL-MCNC: 152 MG/DL (ref 70–99)
GLUCOSE, URINE: NEGATIVE MG/DL
HCT VFR BLD CALC: 42.5 % (ref 37–47)
HEMOGLOBIN: 13.5 GM/DL (ref 12.5–16)
IMMATURE NEUTROPHIL %: 0.5 % (ref 0–0.43)
KETONES, URINE: 40 MG/DL
LACTIC ACID, SEPSIS: 2.3 MMOL/L (ref 0.5–1.9)
LACTIC ACID, SEPSIS: 3.1 MMOL/L (ref 0.5–1.9)
LEUKOCYTE ESTERASE, URINE: NEGATIVE
LIPASE: 28 IU/L (ref 13–60)
LYMPHOCYTES ABSOLUTE: 1.7 K/CU MM
LYMPHOCYTES RELATIVE PERCENT: 16 % (ref 24–44)
MCH RBC QN AUTO: 28.7 PG (ref 27–31)
MCHC RBC AUTO-ENTMCNC: 31.8 % (ref 32–36)
MCV RBC AUTO: 90.4 FL (ref 78–100)
MONOCYTES ABSOLUTE: 0.6 K/CU MM
MONOCYTES RELATIVE PERCENT: 5.4 % (ref 0–4)
MUCUS: ABNORMAL HPF
NITRITE URINE, QUANTITATIVE: NEGATIVE
NUCLEATED RBC %: 0 %
PDW BLD-RTO: 12.7 % (ref 11.7–14.9)
PH, URINE: 7 (ref 5–8)
PLATELET # BLD: 304 K/CU MM (ref 140–440)
PMV BLD AUTO: 10.4 FL (ref 7.5–11.1)
POTASSIUM SERPL-SCNC: 3.1 MMOL/L (ref 3.5–5.1)
PROTEIN UA: ABNORMAL MG/DL
RBC # BLD: 4.7 M/CU MM (ref 4.2–5.4)
RBC URINE: 14 /HPF (ref 0–6)
SEGMENTED NEUTROPHILS ABSOLUTE COUNT: 7.9 K/CU MM
SEGMENTED NEUTROPHILS RELATIVE PERCENT: 73.2 % (ref 36–66)
SODIUM BLD-SCNC: 140 MMOL/L (ref 135–145)
SPECIFIC GRAVITY UA: 1.01 (ref 1–1.03)
SQUAMOUS EPITHELIAL: 8 /HPF
TOTAL IMMATURE NEUTOROPHIL: 0.05 K/CU MM
TOTAL NUCLEATED RBC: 0 K/CU MM
TOTAL PROTEIN: 6.6 GM/DL (ref 6.4–8.2)
TRICHOMONAS: ABNORMAL /HPF
UROBILINOGEN, URINE: 0.2 MG/DL (ref 0.2–1)
WBC # BLD: 10.8 K/CU MM (ref 4–10.5)
WBC UA: 6 /HPF (ref 0–5)

## 2023-09-07 PROCEDURE — 6370000000 HC RX 637 (ALT 250 FOR IP): Performed by: NURSE ANESTHETIST, CERTIFIED REGISTERED

## 2023-09-07 PROCEDURE — 96374 THER/PROPH/DIAG INJ IV PUSH: CPT

## 2023-09-07 PROCEDURE — 3700000001 HC ADD 15 MINUTES (ANESTHESIA): Performed by: UROLOGY

## 2023-09-07 PROCEDURE — 3700000000 HC ANESTHESIA ATTENDED CARE: Performed by: UROLOGY

## 2023-09-07 PROCEDURE — 6370000000 HC RX 637 (ALT 250 FOR IP): Performed by: INTERNAL MEDICINE

## 2023-09-07 PROCEDURE — 80053 COMPREHEN METABOLIC PANEL: CPT

## 2023-09-07 PROCEDURE — C1769 GUIDE WIRE: HCPCS | Performed by: UROLOGY

## 2023-09-07 PROCEDURE — 96372 THER/PROPH/DIAG INJ SC/IM: CPT

## 2023-09-07 PROCEDURE — 2580000003 HC RX 258: Performed by: INTERNAL MEDICINE

## 2023-09-07 PROCEDURE — 6360000004 HC RX CONTRAST MEDICATION: Performed by: UROLOGY

## 2023-09-07 PROCEDURE — 3600000003 HC SURGERY LEVEL 3 BASE: Performed by: UROLOGY

## 2023-09-07 PROCEDURE — 0T778ZZ DILATION OF LEFT URETER, VIA NATURAL OR ARTIFICIAL OPENING ENDOSCOPIC: ICD-10-PCS | Performed by: INTERNAL MEDICINE

## 2023-09-07 PROCEDURE — 85025 COMPLETE CBC W/AUTO DIFF WBC: CPT

## 2023-09-07 PROCEDURE — 74176 CT ABD & PELVIS W/O CONTRAST: CPT

## 2023-09-07 PROCEDURE — 6360000002 HC RX W HCPCS: Performed by: NURSE ANESTHETIST, CERTIFIED REGISTERED

## 2023-09-07 PROCEDURE — 83690 ASSAY OF LIPASE: CPT

## 2023-09-07 PROCEDURE — 2500000003 HC RX 250 WO HCPCS: Performed by: NURSE ANESTHETIST, CERTIFIED REGISTERED

## 2023-09-07 PROCEDURE — 6360000002 HC RX W HCPCS: Performed by: ANESTHESIOLOGY

## 2023-09-07 PROCEDURE — 2580000003 HC RX 258: Performed by: EMERGENCY MEDICINE

## 2023-09-07 PROCEDURE — 88300 SURGICAL PATH GROSS: CPT

## 2023-09-07 PROCEDURE — G0378 HOSPITAL OBSERVATION PER HR: HCPCS

## 2023-09-07 PROCEDURE — C2617 STENT, NON-COR, TEM W/O DEL: HCPCS | Performed by: UROLOGY

## 2023-09-07 PROCEDURE — 87086 URINE CULTURE/COLONY COUNT: CPT

## 2023-09-07 PROCEDURE — 99285 EMERGENCY DEPT VISIT HI MDM: CPT

## 2023-09-07 PROCEDURE — 81001 URINALYSIS AUTO W/SCOPE: CPT

## 2023-09-07 PROCEDURE — 2720000010 HC SURG SUPPLY STERILE: Performed by: UROLOGY

## 2023-09-07 PROCEDURE — C1758 CATHETER, URETERAL: HCPCS | Performed by: UROLOGY

## 2023-09-07 PROCEDURE — 7100000000 HC PACU RECOVERY - FIRST 15 MIN: Performed by: UROLOGY

## 2023-09-07 PROCEDURE — 7100000001 HC PACU RECOVERY - ADDTL 15 MIN: Performed by: UROLOGY

## 2023-09-07 PROCEDURE — 83605 ASSAY OF LACTIC ACID: CPT

## 2023-09-07 PROCEDURE — 1200000000 HC SEMI PRIVATE

## 2023-09-07 PROCEDURE — C9399 UNCLASSIFIED DRUGS OR BIOLOG: HCPCS | Performed by: NURSE ANESTHETIST, CERTIFIED REGISTERED

## 2023-09-07 PROCEDURE — 82360 CALCULUS ASSAY QUANT: CPT

## 2023-09-07 PROCEDURE — 96376 TX/PRO/DX INJ SAME DRUG ADON: CPT

## 2023-09-07 PROCEDURE — 3600000013 HC SURGERY LEVEL 3 ADDTL 15MIN: Performed by: UROLOGY

## 2023-09-07 PROCEDURE — 76000 FLUOROSCOPY <1 HR PHYS/QHP: CPT

## 2023-09-07 PROCEDURE — 2709999900 HC NON-CHARGEABLE SUPPLY: Performed by: UROLOGY

## 2023-09-07 PROCEDURE — BT1F1ZZ FLUOROSCOPY OF LEFT KIDNEY, URETER AND BLADDER USING LOW OSMOLAR CONTRAST: ICD-10-PCS | Performed by: INTERNAL MEDICINE

## 2023-09-07 PROCEDURE — 6360000002 HC RX W HCPCS: Performed by: INTERNAL MEDICINE

## 2023-09-07 PROCEDURE — 6360000002 HC RX W HCPCS: Performed by: EMERGENCY MEDICINE

## 2023-09-07 PROCEDURE — 96375 TX/PRO/DX INJ NEW DRUG ADDON: CPT

## 2023-09-07 DEVICE — VARIABLE LENGTH URETERAL STENT
Type: IMPLANTABLE DEVICE | Site: URETHRA | Status: FUNCTIONAL
Brand: CONTOUR VL™

## 2023-09-07 RX ORDER — ROCURONIUM BROMIDE 10 MG/ML
INJECTION, SOLUTION INTRAVENOUS PRN
Status: DISCONTINUED | OUTPATIENT
Start: 2023-09-07 | End: 2023-09-07 | Stop reason: SDUPTHER

## 2023-09-07 RX ORDER — ESTRADIOL 2 MG/1
4 TABLET ORAL DAILY
COMMUNITY

## 2023-09-07 RX ORDER — SUCCINYLCHOLINE/SOD CL,ISO/PF 100 MG/5ML
SYRINGE (ML) INTRAVENOUS PRN
Status: DISCONTINUED | OUTPATIENT
Start: 2023-09-07 | End: 2023-09-07 | Stop reason: SDUPTHER

## 2023-09-07 RX ORDER — ONDANSETRON 2 MG/ML
4 INJECTION INTRAMUSCULAR; INTRAVENOUS
Status: COMPLETED | OUTPATIENT
Start: 2023-09-07 | End: 2023-09-07

## 2023-09-07 RX ORDER — MORPHINE SULFATE 2 MG/ML
2 INJECTION, SOLUTION INTRAMUSCULAR; INTRAVENOUS
Status: DISCONTINUED | OUTPATIENT
Start: 2023-09-07 | End: 2023-09-08 | Stop reason: HOSPADM

## 2023-09-07 RX ORDER — KETOROLAC TROMETHAMINE 15 MG/ML
15 INJECTION, SOLUTION INTRAMUSCULAR; INTRAVENOUS ONCE
Status: COMPLETED | OUTPATIENT
Start: 2023-09-07 | End: 2023-09-07

## 2023-09-07 RX ORDER — SODIUM CHLORIDE 9 MG/ML
INJECTION, SOLUTION INTRAVENOUS PRN
Status: DISCONTINUED | OUTPATIENT
Start: 2023-09-07 | End: 2023-09-07 | Stop reason: HOSPADM

## 2023-09-07 RX ORDER — ENOXAPARIN SODIUM 100 MG/ML
40 INJECTION SUBCUTANEOUS DAILY
Status: DISCONTINUED | OUTPATIENT
Start: 2023-09-08 | End: 2023-09-08 | Stop reason: HOSPADM

## 2023-09-07 RX ORDER — MORPHINE SULFATE 4 MG/ML
4 INJECTION, SOLUTION INTRAMUSCULAR; INTRAVENOUS
Status: DISCONTINUED | OUTPATIENT
Start: 2023-09-07 | End: 2023-09-08 | Stop reason: HOSPADM

## 2023-09-07 RX ORDER — 0.9 % SODIUM CHLORIDE 0.9 %
1000 INTRAVENOUS SOLUTION INTRAVENOUS ONCE
Status: COMPLETED | OUTPATIENT
Start: 2023-09-07 | End: 2023-09-07

## 2023-09-07 RX ORDER — SCOLOPAMINE TRANSDERMAL SYSTEM 1 MG/1
PATCH, EXTENDED RELEASE TRANSDERMAL PRN
Status: DISCONTINUED | OUTPATIENT
Start: 2023-09-07 | End: 2023-09-07 | Stop reason: SDUPTHER

## 2023-09-07 RX ORDER — PROPOFOL 10 MG/ML
INJECTION, EMULSION INTRAVENOUS CONTINUOUS PRN
Status: DISCONTINUED | OUTPATIENT
Start: 2023-09-07 | End: 2023-09-07 | Stop reason: SDUPTHER

## 2023-09-07 RX ORDER — ONDANSETRON 2 MG/ML
4 INJECTION INTRAMUSCULAR; INTRAVENOUS EVERY 6 HOURS PRN
Status: DISCONTINUED | OUTPATIENT
Start: 2023-09-07 | End: 2023-09-08 | Stop reason: HOSPADM

## 2023-09-07 RX ORDER — OMEPRAZOLE 40 MG/1
40 CAPSULE, DELAYED RELEASE ORAL DAILY
Status: ON HOLD | COMMUNITY
End: 2023-09-07

## 2023-09-07 RX ORDER — LIDOCAINE HYDROCHLORIDE 20 MG/ML
INJECTION, SOLUTION INTRAVENOUS PRN
Status: DISCONTINUED | OUTPATIENT
Start: 2023-09-07 | End: 2023-09-07 | Stop reason: SDUPTHER

## 2023-09-07 RX ORDER — DEXAMETHASONE SODIUM PHOSPHATE 4 MG/ML
INJECTION, SOLUTION INTRA-ARTICULAR; INTRALESIONAL; INTRAMUSCULAR; INTRAVENOUS; SOFT TISSUE PRN
Status: DISCONTINUED | OUTPATIENT
Start: 2023-09-07 | End: 2023-09-07 | Stop reason: SDUPTHER

## 2023-09-07 RX ORDER — POLYETHYLENE GLYCOL 3350 17 G/17G
17 POWDER, FOR SOLUTION ORAL DAILY PRN
Status: DISCONTINUED | OUTPATIENT
Start: 2023-09-07 | End: 2023-09-08 | Stop reason: HOSPADM

## 2023-09-07 RX ORDER — MORPHINE SULFATE 4 MG/ML
4 INJECTION, SOLUTION INTRAMUSCULAR; INTRAVENOUS EVERY 30 MIN PRN
Status: DISCONTINUED | OUTPATIENT
Start: 2023-09-07 | End: 2023-09-07

## 2023-09-07 RX ORDER — FAMOTIDINE 40 MG/1
TABLET, FILM COATED ORAL
Status: ON HOLD | COMMUNITY
Start: 2023-06-30 | End: 2023-09-08

## 2023-09-07 RX ORDER — SODIUM CHLORIDE 0.9 % (FLUSH) 0.9 %
5-40 SYRINGE (ML) INJECTION PRN
Status: DISCONTINUED | OUTPATIENT
Start: 2023-09-07 | End: 2023-09-07 | Stop reason: HOSPADM

## 2023-09-07 RX ORDER — SODIUM CHLORIDE 0.9 % (FLUSH) 0.9 %
5-40 SYRINGE (ML) INJECTION EVERY 12 HOURS SCHEDULED
Status: DISCONTINUED | OUTPATIENT
Start: 2023-09-07 | End: 2023-09-08 | Stop reason: HOSPADM

## 2023-09-07 RX ORDER — ONDANSETRON 2 MG/ML
4 INJECTION INTRAMUSCULAR; INTRAVENOUS EVERY 30 MIN PRN
Status: DISCONTINUED | OUTPATIENT
Start: 2023-09-07 | End: 2023-09-07

## 2023-09-07 RX ORDER — SODIUM CHLORIDE, SODIUM LACTATE, POTASSIUM CHLORIDE, CALCIUM CHLORIDE 600; 310; 30; 20 MG/100ML; MG/100ML; MG/100ML; MG/100ML
INJECTION, SOLUTION INTRAVENOUS CONTINUOUS PRN
Status: DISCONTINUED | OUTPATIENT
Start: 2023-09-07 | End: 2023-09-07 | Stop reason: SDUPTHER

## 2023-09-07 RX ORDER — PROMETHAZINE HYDROCHLORIDE 25 MG/ML
25 INJECTION, SOLUTION INTRAMUSCULAR; INTRAVENOUS ONCE
Status: COMPLETED | OUTPATIENT
Start: 2023-09-07 | End: 2023-09-07

## 2023-09-07 RX ORDER — SODIUM CHLORIDE 9 MG/ML
INJECTION, SOLUTION INTRAVENOUS PRN
Status: DISCONTINUED | OUTPATIENT
Start: 2023-09-07 | End: 2023-09-08 | Stop reason: HOSPADM

## 2023-09-07 RX ORDER — FENTANYL CITRATE 50 UG/ML
25 INJECTION, SOLUTION INTRAMUSCULAR; INTRAVENOUS EVERY 5 MIN PRN
Status: DISCONTINUED | OUTPATIENT
Start: 2023-09-07 | End: 2023-09-07 | Stop reason: HOSPADM

## 2023-09-07 RX ORDER — SODIUM CHLORIDE 0.9 % (FLUSH) 0.9 %
5-40 SYRINGE (ML) INJECTION PRN
Status: DISCONTINUED | OUTPATIENT
Start: 2023-09-07 | End: 2023-09-08 | Stop reason: HOSPADM

## 2023-09-07 RX ORDER — ONDANSETRON 4 MG/1
4 TABLET, ORALLY DISINTEGRATING ORAL EVERY 8 HOURS PRN
Status: DISCONTINUED | OUTPATIENT
Start: 2023-09-07 | End: 2023-09-08 | Stop reason: HOSPADM

## 2023-09-07 RX ORDER — FLUOXETINE 10 MG/1
10 CAPSULE ORAL DAILY
COMMUNITY

## 2023-09-07 RX ORDER — FENTANYL CITRATE 50 UG/ML
INJECTION, SOLUTION INTRAMUSCULAR; INTRAVENOUS PRN
Status: DISCONTINUED | OUTPATIENT
Start: 2023-09-07 | End: 2023-09-07 | Stop reason: SDUPTHER

## 2023-09-07 RX ORDER — FAMOTIDINE 40 MG/1
40 TABLET, FILM COATED ORAL NIGHTLY
COMMUNITY

## 2023-09-07 RX ORDER — TAMSULOSIN HYDROCHLORIDE 0.4 MG/1
0.4 CAPSULE ORAL DAILY
Status: DISCONTINUED | OUTPATIENT
Start: 2023-09-07 | End: 2023-09-08 | Stop reason: HOSPADM

## 2023-09-07 RX ORDER — SODIUM CHLORIDE 9 MG/ML
INJECTION, SOLUTION INTRAVENOUS CONTINUOUS
Status: DISCONTINUED | OUTPATIENT
Start: 2023-09-07 | End: 2023-09-07 | Stop reason: SDUPTHER

## 2023-09-07 RX ORDER — SODIUM CHLORIDE 0.9 % (FLUSH) 0.9 %
5-40 SYRINGE (ML) INJECTION EVERY 12 HOURS SCHEDULED
Status: DISCONTINUED | OUTPATIENT
Start: 2023-09-07 | End: 2023-09-07 | Stop reason: HOSPADM

## 2023-09-07 RX ORDER — SODIUM CHLORIDE 9 MG/ML
INJECTION, SOLUTION INTRAVENOUS CONTINUOUS
Status: DISCONTINUED | OUTPATIENT
Start: 2023-09-07 | End: 2023-09-08 | Stop reason: HOSPADM

## 2023-09-07 RX ORDER — ONDANSETRON 2 MG/ML
INJECTION INTRAMUSCULAR; INTRAVENOUS PRN
Status: DISCONTINUED | OUTPATIENT
Start: 2023-09-07 | End: 2023-09-07 | Stop reason: SDUPTHER

## 2023-09-07 RX ORDER — PROCHLORPERAZINE EDISYLATE 5 MG/ML
5 INJECTION INTRAMUSCULAR; INTRAVENOUS
Status: DISCONTINUED | OUTPATIENT
Start: 2023-09-07 | End: 2023-09-07 | Stop reason: HOSPADM

## 2023-09-07 RX ORDER — PROPOFOL 10 MG/ML
INJECTION, EMULSION INTRAVENOUS PRN
Status: DISCONTINUED | OUTPATIENT
Start: 2023-09-07 | End: 2023-09-07 | Stop reason: SDUPTHER

## 2023-09-07 RX ADMIN — PROPOFOL 50 MCG/KG/MIN: 10 INJECTION, EMULSION INTRAVENOUS at 14:27

## 2023-09-07 RX ADMIN — SCOLOPAMINE TRANSDERMAL SYSTEM 1 PATCH: 1 PATCH, EXTENDED RELEASE TRANSDERMAL at 14:31

## 2023-09-07 RX ADMIN — SODIUM CHLORIDE, PRESERVATIVE FREE 10 ML: 5 INJECTION INTRAVENOUS at 21:33

## 2023-09-07 RX ADMIN — SODIUM CHLORIDE 1000 ML: 9 INJECTION, SOLUTION INTRAVENOUS at 07:41

## 2023-09-07 RX ADMIN — ONDANSETRON 4 MG: 2 INJECTION INTRAMUSCULAR; INTRAVENOUS at 07:40

## 2023-09-07 RX ADMIN — DEXAMETHASONE SODIUM PHOSPHATE 4 MG: 4 INJECTION, SOLUTION INTRAMUSCULAR; INTRAVENOUS at 14:49

## 2023-09-07 RX ADMIN — SODIUM CHLORIDE: 9 INJECTION, SOLUTION INTRAVENOUS at 17:03

## 2023-09-07 RX ADMIN — SODIUM CHLORIDE 1000 ML: 9 INJECTION, SOLUTION INTRAVENOUS at 09:36

## 2023-09-07 RX ADMIN — PROMETHAZINE HYDROCHLORIDE 25 MG: 25 INJECTION INTRAMUSCULAR; INTRAVENOUS at 09:36

## 2023-09-07 RX ADMIN — SUGAMMADEX 100 MG: 100 INJECTION, SOLUTION INTRAVENOUS at 14:50

## 2023-09-07 RX ADMIN — KETOROLAC TROMETHAMINE 15 MG: 15 INJECTION, SOLUTION INTRAMUSCULAR; INTRAVENOUS at 08:30

## 2023-09-07 RX ADMIN — TAMSULOSIN HYDROCHLORIDE 0.4 MG: 0.4 CAPSULE ORAL at 21:58

## 2023-09-07 RX ADMIN — ROCURONIUM BROMIDE 20 MG: 10 INJECTION INTRAVENOUS at 14:37

## 2023-09-07 RX ADMIN — ONDANSETRON 4 MG: 2 INJECTION INTRAMUSCULAR; INTRAVENOUS at 08:30

## 2023-09-07 RX ADMIN — ONDANSETRON 4 MG: 2 INJECTION INTRAMUSCULAR; INTRAVENOUS at 16:09

## 2023-09-07 RX ADMIN — MORPHINE SULFATE 4 MG: 4 INJECTION, SOLUTION INTRAMUSCULAR; INTRAVENOUS at 07:41

## 2023-09-07 RX ADMIN — MORPHINE SULFATE 4 MG: 4 INJECTION, SOLUTION INTRAMUSCULAR; INTRAVENOUS at 22:15

## 2023-09-07 RX ADMIN — CEFTRIAXONE SODIUM 1000 MG: 1 INJECTION, POWDER, FOR SOLUTION INTRAMUSCULAR; INTRAVENOUS at 10:20

## 2023-09-07 RX ADMIN — LIDOCAINE HYDROCHLORIDE 100 MG: 20 INJECTION, SOLUTION INTRAVENOUS at 14:23

## 2023-09-07 RX ADMIN — MORPHINE SULFATE 4 MG: 4 INJECTION, SOLUTION INTRAMUSCULAR; INTRAVENOUS at 08:30

## 2023-09-07 RX ADMIN — ONDANSETRON 4 MG: 2 INJECTION INTRAMUSCULAR; INTRAVENOUS at 14:49

## 2023-09-07 RX ADMIN — SODIUM CHLORIDE, SODIUM LACTATE, POTASSIUM CHLORIDE, CALCIUM CHLORIDE: 600; 310; 30; 20 INJECTION, SOLUTION INTRAVENOUS at 14:21

## 2023-09-07 RX ADMIN — FENTANYL CITRATE 100 MCG: 50 INJECTION, SOLUTION INTRAMUSCULAR; INTRAVENOUS at 14:23

## 2023-09-07 RX ADMIN — PROPOFOL 200 MG: 10 INJECTION, EMULSION INTRAVENOUS at 14:23

## 2023-09-07 RX ADMIN — Medication 100 MG: at 14:24

## 2023-09-07 ASSESSMENT — PAIN SCALES - GENERAL
PAINLEVEL_OUTOF10: 3
PAINLEVEL_OUTOF10: 7
PAINLEVEL_OUTOF10: 0
PAINLEVEL_OUTOF10: 8
PAINLEVEL_OUTOF10: 0
PAINLEVEL_OUTOF10: 7

## 2023-09-07 ASSESSMENT — PAIN DESCRIPTION - FREQUENCY
FREQUENCY: INTERMITTENT
FREQUENCY: CONTINUOUS

## 2023-09-07 ASSESSMENT — PAIN DESCRIPTION - LOCATION
LOCATION: ABDOMEN
LOCATION: ABDOMEN
LOCATION: ABDOMEN;FLANK;GROIN
LOCATION: ABDOMEN

## 2023-09-07 ASSESSMENT — PAIN DESCRIPTION - DESCRIPTORS
DESCRIPTORS: SHARP
DESCRIPTORS: ACHING;DISCOMFORT;CRAMPING
DESCRIPTORS: BURNING;ACHING;DULL
DESCRIPTORS: CRAMPING

## 2023-09-07 ASSESSMENT — PAIN - FUNCTIONAL ASSESSMENT
PAIN_FUNCTIONAL_ASSESSMENT: PREVENTS OR INTERFERES SOME ACTIVE ACTIVITIES AND ADLS
PAIN_FUNCTIONAL_ASSESSMENT: 0-10
PAIN_FUNCTIONAL_ASSESSMENT: PREVENTS OR INTERFERES SOME ACTIVE ACTIVITIES AND ADLS

## 2023-09-07 ASSESSMENT — PAIN DESCRIPTION - PAIN TYPE
TYPE: SURGICAL PAIN
TYPE: SURGICAL PAIN

## 2023-09-07 ASSESSMENT — PAIN DESCRIPTION - ONSET: ONSET: ON-GOING

## 2023-09-07 ASSESSMENT — LIFESTYLE VARIABLES
HOW OFTEN DO YOU HAVE A DRINK CONTAINING ALCOHOL: NEVER
HOW MANY STANDARD DRINKS CONTAINING ALCOHOL DO YOU HAVE ON A TYPICAL DAY: PATIENT DOES NOT DRINK

## 2023-09-07 ASSESSMENT — PAIN DESCRIPTION - ORIENTATION
ORIENTATION: RIGHT;LEFT
ORIENTATION: LOWER
ORIENTATION: LEFT;LOWER

## 2023-09-07 NOTE — PROGRESS NOTES
Left ureteral stone removed  Stent placed to be removed no sooner than next week  Optimistic safe for discharge tomorrow AM

## 2023-09-07 NOTE — ED NOTES
ED TO INPATIENT SBAR HANDOFF    Patient Name: Brianna Mitchell   :  1970  47 y.o. Preferred Name  Traci Crenshaw  Family/Caregiver Present yes   Restraints no   C-SSRS: Risk of Suicide: No Risk  Sitter no   Sepsis Risk Score Sepsis Risk Score: 0.62      Situation  Chief Complaint   Patient presents with    Emesis     Since last night after eating. Abdominal Pain     Radiating to L side     Brief Description of Patient's Condition: Patient to ER. A & O x 4. Patient able to ambulate without difficulty. Mental Status: oriented, alert, coherent, logical, thought processes intact, and able to concentrate and follow conversation  Arrived from: home    Imaging:   CT ABDOMEN PELVIS WO CONTRAST Additional Contrast? None   Final Result   There is a 3-4 mm stone in the distal left ureter at the UVJ with mild   upstream obstructive uropathy. High attenuation left renal focus measuring 2.3 cm, new from remote exam   10/21/2011. While this may reflect a hyperdense cyst, other etiology cannot   be excluded. Recommend further evaluation on a nonemergent basis with   dedicated renal mass protocol CT or MRI. Colonic diverticulosis without evidence for diverticulitis. Trace hiatal hernia. Tiny fat containing ventral abdominal hernias.            Abnormal labs:   Abnormal Labs Reviewed   CBC WITH AUTO DIFFERENTIAL - Abnormal; Notable for the following components:       Result Value    WBC 10.8 (*)     MCHC 31.8 (*)     Segs Relative 73.2 (*)     Lymphocytes % 16.0 (*)     Monocytes % 5.4 (*)     Eosinophils % 4.3 (*)     Immature Neutrophil % 0.5 (*)     All other components within normal limits   COMPREHENSIVE METABOLIC PANEL - Abnormal; Notable for the following components:    Potassium 3.1 (*)     Glucose 152 (*)     AST 14 (*)     All other components within normal limits   URINALYSIS - Abnormal; Notable for the following components:    Clarity, UA CLOUDY (*)     Ketones, Urine 40 (*)     Blood, Urine Patient is present in office requesting labs

## 2023-09-07 NOTE — ED NOTES
Report received from [facility], [address]. Care assumed at this time.       Linnea Seay RN  09/07/23 1982

## 2023-09-07 NOTE — CONSULTS
[address], [address]   Consult Note  [facility] 1 2 3 4 5    Date: 2023   Patient: Dilia Fierro   : 1970   DOA: 2023   MRN: [id number]   ROOM#: TR01/[id number]-01     Reason for Consult: Obstructing stone, lactic acidosis  Requesting Physician: Dr. Samm Sarmiento  Collaborating Urologist on Call at time of admission: Dr. Rohit Souza: Left flank pain    History Obtained From: patient, electronic medical record    HISTORY OF PRESENT ILLNESS:                The patient is a 47 y.o. female with significant past medical history of kidney stones and TIA who presented with left flank pain, LLQ abdominal pain, and vomiting since last night. Pt reports intermittent left flank pain and nausea x2wks, acutely worsening and constant since last night. She does endorse associated mild dysuria and shaking chills. No fevers, gross hematuria, or other symptoms. Work-up in the ED revealed a 3-4mm left UVJ calculus and elevated LA (3.1). Her urine is cloudy but is negative for leuks/nitrites and pt is AF/VSS. Pt continues to report left flank/LLQ abdominal pain on my examination. She also was experiencing cold chills. Reports passing a kidney stones many years ago. Of note, pt was planning on driving her son to his college in [place] tomorrow evening. Discussed recommendation for cystoscopy, left retrograde pyelogram, possible left ureteroscopy/stone manipulation, and ureteral stent placement today with associated risks/benefits. Pt verbalizes understanding and is agreeable to proceed.       Past Medical History:        Diagnosis Date    Edema     Hands, Lower Extremities frequently    Hypokalemia 2020    Nausea & vomiting     Severe    Swelling, lymph nodes 10/11    Lymphadema bilateral neck    TIA (transient ischemic attack) 2020     Past Surgical History:        Procedure Laterality Date    ABDOMINAL SURGERY      Exploratory Laparotomy    [facility] Tissues: No acute bone or soft tissue abnormality. No suspicious focal bony lesions. There is a 3-4 mm stone in the distal left ureter at the UVJ with mild upstream obstructive uropathy. High attenuation left renal focus measuring 2.3 cm, new from remote exam 10/21/2011. While this may reflect a hyperdense cyst, other etiology cannot be excluded. Recommend further evaluation on a nonemergent basis with dedicated renal mass protocol CT or MRI. Colonic diverticulosis without evidence for diverticulitis. Trace hiatal hernia. Tiny fat containing ventral abdominal hernias. Assessment & Plan:      Rosa Lopez is a 47 y.o. female admitted 9/7/2023 for left ureteral stone. 1) Left Ureteral Calculus: CT a/p 9/7/23 shows a 3-4 mm stone in the distal left ureter at the UVJ with mild upstream obstructive uropathy   Cr 0.9; UA w 6 WBC's, 14 RBC's; urine cx ordered. On IV Rocephin. LA 3.1; WBC 10.8; AF/VSS. NPO since [place]; not on [facility]   Plan for cystoscopy, left retrograde pyelogram, possible left ureteroscopy/stone manipulation, and ureteral stent placement today  2) Left Renal Lesion: CT a/p wo contrast shows a High attenuation left renal focus measuring 2.3 cm, new from remote exam 10/21/2011, possibly a hyperdense cyst.   Recommend further evaluation with a renal protocol CT vs MRI in the near future. Will follow. Patient seen and examined, chart reviewed.      Electronically signed by Danii Mejía PA-C on 9/7/2023 at 10:24 AM

## 2023-09-07 NOTE — ANESTHESIA POSTPROCEDURE EVALUATION
Department of Anesthesiology  Postprocedure Note    Patient: Tamir Perales  MRN: [id number]  YOB: 1970  Date of evaluation: 9/7/2023      Procedure Summary     Date: 09/07/23 Room / Location: [address] [place] [address]    Anesthesia Start: 0108 Anesthesia Stop: 8051    Procedure: [address] (Left: Urethra) Diagnosis:       Kidney stone      (Kidney stone [N20.0])    Surgeons: Charis Stewart MD Responsible Provider: Octaviano Rivera MD    Anesthesia Type: General ASA Status: 2 - Emergent          Anesthesia Type: General    Rito Phase I: Rito Score: 10    Rito Phase II:        Anesthesia Post Evaluation    Patient location during evaluation: PACU  Patient participation: complete - patient participated  Pain score: 2  Airway patency: patent  Nausea & Vomiting: no nausea and no vomiting  Complications: no  Cardiovascular status: blood pressure returned to baseline  Respiratory status: acceptable and room air  Hydration status: euvolemic  Pain management: adequate

## 2023-09-07 NOTE — H&P
TROPONINT <0.010 12/16/2020 05:50 PM     Lactic Acid: No results for input(s): LACTA in the last 72 hours. BNP: No results for input(s): PROBNP in the last 72 hours. UA:  Lab Results   Component Value Date/Time    NITRU NEGATIVE 09/07/2023 08:50 AM    COLORU YELLOW 09/07/2023 08:50 AM    WBCUA 6 09/07/2023 08:50 AM    RBCUA 14 09/07/2023 08:50 AM    MUCUS RARE 09/07/2023 08:50 AM    TRICHOMONAS NONE SEEN 09/07/2023 08:50 AM    BACTERIA NEGATIVE 09/07/2023 08:50 AM    CLARITYU CLOUDY 09/07/2023 08:50 AM    SPECGRAV 1.015 09/07/2023 08:50 AM    LEUKOCYTESUR NEGATIVE 09/07/2023 08:50 AM    UROBILINOGEN 0.2 09/07/2023 08:50 AM    BILIRUBINUR NEGATIVE 09/07/2023 08:50 AM    BLOODU SMALL NUMBER OR AMOUNT OBSERVED 09/07/2023 08:50 AM    KETUA 40 09/07/2023 08:50 AM     Urine Cultures: No results found for: Loyda Narayan  Blood Cultures: No results found for: BC  No results found for: BLOODCULT2  Organism: No results found for: ORG    Imaging/Diagnostics Last 24 Hours   CT ABDOMEN PELVIS WO CONTRAST Additional Contrast? None    Result Date: 9/7/2023  EXAMINATION: CT OF THE ABDOMEN AND PELVIS WITHOUT CONTRAST 9/7/2023 8:08 am TECHNIQUE: CT of the abdomen and pelvis was performed without the administration of intravenous contrast. Multiplanar reformatted images are provided for review. Automated exposure control, iterative reconstruction, and/or weight based adjustment of the mA/kV was utilized to reduce the radiation dose to as low as reasonably achievable. COMPARISON: 10/21/2011.  HISTORY: ORDERING SYSTEM PROVIDED HISTORY: left flank pain, vomiting, diarrhea, syncope TECHNOLOGIST PROVIDED HISTORY: Reason for exam:->left flank pain, vomiting, diarrhea, syncope Additional Contrast?->None Decision Support Exception - unselect if not a suspected or confirmed emergency medical condition->Emergency Medical Condition (MA) Is the patient pregnant?->No Reason for Exam: left flank pain, vomiting, diarrhea, syncope FINDINGS: Lower Chest: Minimal linear scarring versus atelectasis to the lung bases. No focal consolidations or pleural effusions. Heart is normal in size. No pericardial effusion. Organs: There is a 3-4 mm stone in the distal left ureter at the UVJ with mild upstream obstructive uropathy. No additional urinary tract stones are seen on either side. High attenuation left renal focus measuring 1.9 x 2.3 cm with Hounsfield units in the 50's, new from prior exam (reference image 49, axial sequence, series 2). Unremarkable unenhanced liver, spleen, pancreas, adrenal glands and right kidney. Interval cholecystectomy as compared to prior exam. GI/Bowel: No evidence for bowel obstruction or definite bowel wall thickening to un opacified large or small bowel. Colonic diverticulosis without evidence for diverticulitis. No evidence for acute appendicitis. Trace hiatal hernia. Pelvis: Unremarkable urinary bladder. Prior hysterectomy. No adnexal masses are seen. Multiple phleboliths in the pelvis. Peritoneum/Retroperitoneum: No ascites or focal fluid collections. No intraperitoneal free air. No evidence for abdominal aortic aneurysm. No lymphadenopathy. Tiny fat containing periumbilical hernia. Tiny fat containing ventral abdominal wall hernia above the level of the umbilicus. Bones/Soft Tissues: No acute bone or soft tissue abnormality. No suspicious focal bony lesions. There is a 3-4 mm stone in the distal left ureter at the UVJ with mild upstream obstructive uropathy. High attenuation left renal focus measuring 2.3 cm, new from remote exam 10/21/2011. While this may reflect a hyperdense cyst, other etiology cannot be excluded. Recommend further evaluation on a nonemergent basis with dedicated renal mass protocol CT or MRI. Colonic diverticulosis without evidence for diverticulitis. Trace hiatal hernia. Tiny fat containing ventral abdominal hernias.          Electronically signed by Nixon Watts MD on 9/7/2023 at 10:09 AM

## 2023-09-07 NOTE — ED PROVIDER NOTES
Emergency Department Encounter    Patient: Anne Hou  MRN: [id number]  : 1970  Date of Evaluation: 2023  ED Provider:  Nagi Tong MD    Triage Chief Complaint:   Emesis (Since last night after eating.) and Abdominal Pain (Radiating to L side)    [place]:  Anne Hou is a 47 y.o. female that presents with concern for nausea, vomiting, diarrhea. She has been sick since last night, thought she ate something bad. On Saturday her son moves to [place] for college, they ate out at [employer] last night and she has been sick since then. No blood in the vomit or diarrhea. She has been having chills and sweats but no measured fevers. Since yesterday she is also having severe left flank pain and she is feeling like she will pass out. She had almost passed out in the car and security had to help get her into the wheelchair and lift her onto the bed. Brought in by her other son. She does have a history of kidney stones and is not sure if it could be that or if she is dehydrated or if she has food poisoning. She does have a history of a TIA 3 years ago. She denies weakness and numbness in extremities. No chest pain or shortness of breath. Has had continued retching and diarrhea for the last several days. ROS - see HPI, below listed is current ROS at time of my eval:  10 systems reviewed and negative except as above.      Past Medical History:   Diagnosis Date    Edema     Hands, Lower Extremities frequently    Hypokalemia 2020    Nausea & vomiting     Severe    Swelling, lymph nodes 10/11    Lymphadema bilateral neck    TIA (transient ischemic attack) 2020     Past Surgical History:   Procedure Laterality Date    ABDOMINAL SURGERY      Exploratory Laparotomy    CHOLECYSTECTOMY      DENTAL SURGERY      Caps On Teeth    DILATION AND CURETTAGE OF UTERUS      X 4 in Past, Last One     HERNIA REPAIR      HYSTERECTOMY      Robotic Lap Total Hysterectomy    LAPAROSCOPY X3 Last one , laser     TONSILLECTOMY  1977    T & A    TUBAL LIGATION  2007     Family History   Problem Relation Age of Onset    Arthritis Mother     Hearing Loss Mother     High Blood Pressure Mother     High Cholesterol Mother     Other Mother 48        Subdurmal Hematoma    Heart Disease Mother         CAD    Cancer Father 61        Renal Cell     Diabetes Father     High Cholesterol Father     Asthma Son     Other Son         GERD    Asthma Son     Other Son         Bronchomalacia Tracheomalacia     Social History     Socioeconomic History    Marital status:      Spouse name: Enrique Flowers    Number of children: 2    Years of education: Not on file    Highest education level: Not on file   Occupational History    Occupation: LPN   Tobacco Use    Smoking status: Former     Types: Cigarettes     Quit date: 2005     Years since quittin.4    Smokeless tobacco: Never   Substance and Sexual Activity    Alcohol use: Yes     Comment: socially    Drug use: No    Sexual activity: Not on file   Other Topics Concern    Not on file   Social History Narrative    Do you donate blood or plasma? No    Caffeine intake? 2 cups daily    Advance directive? No    Is blood transfusion acceptable in an emergency?  Yes             Social Determinants of Health     Financial Resource Strain: Not on file   Food Insecurity: Not on file   Transportation Needs: Not on file   Physical Activity: Not on file   Stress: Not on file   Social Connections: Not on file   Intimate Partner Violence: Not on file   Housing Stability: Not on file     Current Facility-Administered Medications   Medication Dose Route Frequency Provider Last Rate Last Admin    morphine sulfate (PF) injection 4 mg  4 mg IntraVENous Q30 Min PRN Cholo Iraheta MD   4 mg at 23 0830    ondansetron (ZOFRAN) injection 4 mg  4 mg IntraVENous Q30 Min PRN Cholo Iraheta MD   4 mg at 23 0830    0.9 % sodium chloride infusion   IntraVENous Continuous Sherryl Nageotte

## 2023-09-07 NOTE — OP NOTE
[facility]   Operative Note    Date: 2023   Patient: Horace Thompson   : 1970   DOA: 2023   MRN: [id number]   ROOM#: OR/NONE     Pre-operative Diagnosis: Left ureteral stone    Post-operative Diagnosis: same    Procedure: Cystoscopy, left retrograde pyelogram/ureteroscopy/stone manipulation with baskete/stent insertion    Anesthesia: General endotracheal anesthesia    Surgeons/Assistants: Jhony Pierre    EBL: Minimal    Complications: none    Specimens: were obtained    Indication for Procedure:      Horace Thompson is a 47 y.o. female with history of ureterolithiasis. Imaging suggested a left ureteral calculus which measured 4 mm. The patient was unable to get pain relief and unable to pass calculus, so Demi Leal was brought to the OR today for cystourethroscopy, left retrograde pyelogram, left ureteroscopy with holmium laser manipulation of stone, extraction of stone fragments, and left ureteral stent insertion. Risks and benefits were explained & Horace Thompson agreed to proceed as planned. Description of procedure: The patient was identified in the holding area, taken to procedure room, and placed in supine position. General anesthesia was administered. Time out was taken to ensure this was the proper operation, for the proper patient, on the proper side; everyone in the room agreed. The patient was then placed in the dorsal lithotomy position, sterilely prepped and then draped in the usual fashion. Rigid cystoscopy ensued. A left retrograde pyelogram with a cone tipped catheter was performed opacifying the collecting system with findings of left distal ureteral filling defect and mild left HN/HU. A sensor wire was then placed into the patient's left renal pelvis. Rigid ureteroscopy ensued. A left ureteral stone was noted. Using a basket it was extracted. No stone burden was seen to be remaining.      I then removed the ureteroscope, and using the previously placed sensor wire I passed up a 4.8 [place] variable length double J stent with a cystoscope. It was noted to be in good position proximally fluoroscopically and distally cystoscopically. The patient's bladder was drained. Demi Leal tolerated the procedure well & without difficulty and was then transferred to PACU to recover. I will plan to see Brian Leal back in the office in ~ one week for left ureteral stent removal via cystourethroscopy. Left retrograde pyelogram was as follows:  Using a cone-tip catheter, contrast was injected in the left collecting system opacifying it to completion. A filling defect was seen in the left ureter consistent with that noted on prior imaging.     hJoana Jean MD  Electronically signed at 9/7/2023

## 2023-09-07 NOTE — ED NOTES
Patient resting comfortably with eyes closed. Resps even and unlabored. Remains on cardiac monitor.       Edwin Blum RN  09/07/23 1831

## 2023-09-07 NOTE — PROGRESS NOTES
1500- pt. Arrived to pacu via cart from OR. Pt. Attached to monitor and alarms are on. Received report from samira hooker and Brianna [address]. Pt. Is drowsy from anesthesia but responds to verbal stimuli and able to follow command pt. Denies pain or n/v at this time. Pt. Is on 6L via simple mask. 1600- pt. Is resting with eyes closed but awakens easily. Pt. States has slight cramping but having nausea at this time. Will medicate per MAR. Pt. Educated on zofran. She verbalized understanding. 1630- pt. States nausea is better. She is currently waiting for room to be available at this time. Pt. Updated on plan of care and denies any other questions or concerns. 1700- gave report to [address].

## 2023-09-07 NOTE — PROGRESS NOTES
CT ABDOMEN PELVIS WO CONTRAST Additional Contrast? None     Result Date: 9/7/2023  EXAMINATION: CT OF THE ABDOMEN AND PELVIS WITHOUT CONTRAST 9/7/2023 8:08 am TECHNIQUE: CT of the abdomen and pelvis was performed without the administration of intravenous contrast. Multiplanar reformatted images are provided for review. Automated exposure control, iterative reconstruction, and/or weight based adjustment of the mA/kV was utilized to reduce the radiation dose to as low as reasonably achievable. COMPARISON: 10/21/2011. HISTORY: ORDERING SYSTEM PROVIDED HISTORY: left flank pain, vomiting, diarrhea, syncope TECHNOLOGIST PROVIDED HISTORY: Reason for exam:->left flank pain, vomiting, diarrhea, syncope Additional Contrast?->None Decision Support Exception - unselect if not a suspected or confirmed emergency medical condition->Emergency Medical Condition (MA) Is the patient pregnant?->No Reason for Exam: left flank pain, vomiting, diarrhea, syncope FINDINGS: Lower Chest: Minimal linear scarring versus atelectasis to the lung bases. No focal consolidations or pleural effusions. Heart is normal in size. No pericardial effusion. Organs: There is a 3-4 mm stone in the distal left ureter at the UVJ with mild upstream obstructive uropathy. No additional urinary tract stones are seen on either side. High attenuation left renal focus measuring 1.9 x 2.3 cm with Hounsfield units in the 50's, new from prior exam (reference image 49, axial sequence, series 2). Unremarkable unenhanced liver, spleen, pancreas, adrenal glands and right kidney. Interval cholecystectomy as compared to prior exam. GI/Bowel: No evidence for bowel obstruction or definite bowel wall thickening to un opacified large or small bowel. Colonic diverticulosis without evidence for diverticulitis. No evidence for acute appendicitis. Trace hiatal hernia. Pelvis: Unremarkable urinary bladder. Prior hysterectomy. No adnexal masses are seen.   Multiple phleboliths

## 2023-09-07 NOTE — ED NOTES
Medication History  [facility]    Patient Name: Lashonda Villar 1970     Medication history has been completed by: Gal Pacheco CPhT    Source(s) of information: patient and retail pharmacy     Primary Care Physician: BIA Morgan - CNP     Pharmacy: [facility]    Allergies as of 09/07/2023 - Fully Reviewed 09/07/2023   Allergen Reaction Noted    Tequin [gatifloxacin-d5w] Other (See Comments) 11/14/2011        Prior to Admission medications    Medication Sig Start Date End Date Taking? Authorizing Provider   FLUoxetine (PROZAC) 10 MG capsule Take 1 capsule by mouth daily   Yes Historical Provider, MD   omeprazole (PRILOSEC) 40 MG delayed release capsule Take 1 capsule by mouth daily   Yes Historical Provider, MD   famotidine (PEPCID) 40 MG tablet Take 1 tablet by mouth nightly   Yes Historical Provider, MD   estradiol (ESTRACE) 2 MG tablet Take 2 tablets by mouth daily   Yes Historical Provider, MD   ondansetron (ZOFRAN-ODT) 4 MG disintegrating tablet Take 1 tablet by mouth 3 times daily as needed for Nausea or Vomiting (Headache) 12/16/20   Brian Lewis MD   triamterene-hydrochlorothiazide (DYAZIDE) 37.5-25 MG per capsule Take 1 capsule by mouth daily 2/20/17   Eagle Hernandez PA-C   ibuprofen (ADVIL) 200 MG CAPS Take 1 capsule by mouth as needed. Historical Provider, MD     Medications added or changed (ex. new medication, dosage change, interval change, formulation change): Omeprazole (added)  Famotidine (added)  Estradiol patch changed to tablets   Fluoxetine (added)    Medications removed from list (include reason, ex. noncompliance, medication cost, therapy complete etc.):   Naproxen prescription not taking  Medrol therapy complete  Dicyclomine not taking  Bromfed not taking  Compression stockings not using    Comments:  Medication list reviewed with patient and verified with retail pharmacy as patient uses Good Rx card.   Per retail pharmacy patient is past due on all

## 2023-09-08 VITALS
BODY MASS INDEX: 24.43 KG/M2 | TEMPERATURE: 98.7 F | DIASTOLIC BLOOD PRESSURE: 80 MMHG | WEIGHT: 152 LBS | OXYGEN SATURATION: 98 % | HEART RATE: 70 BPM | SYSTOLIC BLOOD PRESSURE: 112 MMHG | HEIGHT: 66 IN | RESPIRATION RATE: 16 BRPM

## 2023-09-08 LAB
ANION GAP SERPL CALCULATED.3IONS-SCNC: 11 MMOL/L (ref 4–16)
BASOPHILS ABSOLUTE: 0 K/CU MM
BASOPHILS RELATIVE PERCENT: 0.6 % (ref 0–1)
BUN SERPL-MCNC: 8 MG/DL (ref 6–23)
CALCIUM SERPL-MCNC: 7.8 MG/DL (ref 8.3–10.6)
CHLORIDE BLD-SCNC: 108 MMOL/L (ref 99–110)
CO2: 23 MMOL/L (ref 21–32)
CREAT SERPL-MCNC: 0.6 MG/DL (ref 0.6–1.1)
DIFFERENTIAL TYPE: ABNORMAL
EOSINOPHILS ABSOLUTE: 0 K/CU MM
EOSINOPHILS RELATIVE PERCENT: 0.6 % (ref 0–3)
ESTIMATED AVERAGE GLUCOSE: 120 MG/DL
GFR SERPL CREATININE-BSD FRML MDRD: >60 ML/MIN/1.73M2
GLUCOSE SERPL-MCNC: 97 MG/DL (ref 70–99)
HBA1C MFR BLD: 5.8 % (ref 4.2–6.3)
HCT VFR BLD CALC: 38 % (ref 37–47)
HEMOGLOBIN: 12 GM/DL (ref 12.5–16)
IMMATURE NEUTROPHIL %: 0.4 % (ref 0–0.43)
LACTATE: 1.6 MMOL/L (ref 0.5–1.9)
LYMPHOCYTES ABSOLUTE: 1.9 K/CU MM
LYMPHOCYTES RELATIVE PERCENT: 26.4 % (ref 24–44)
MCH RBC QN AUTO: 28.8 PG (ref 27–31)
MCHC RBC AUTO-ENTMCNC: 31.6 % (ref 32–36)
MCV RBC AUTO: 91.3 FL (ref 78–100)
MONOCYTES ABSOLUTE: 0.6 K/CU MM
MONOCYTES RELATIVE PERCENT: 8 % (ref 0–4)
NUCLEATED RBC %: 0 %
PDW BLD-RTO: 13.1 % (ref 11.7–14.9)
PLATELET # BLD: 231 K/CU MM (ref 140–440)
PMV BLD AUTO: 10.3 FL (ref 7.5–11.1)
POTASSIUM SERPL-SCNC: 3.3 MMOL/L (ref 3.5–5.1)
RBC # BLD: 4.16 M/CU MM (ref 4.2–5.4)
SEGMENTED NEUTROPHILS ABSOLUTE COUNT: 4.5 K/CU MM
SEGMENTED NEUTROPHILS RELATIVE PERCENT: 64 % (ref 36–66)
SODIUM BLD-SCNC: 142 MMOL/L (ref 135–145)
TOTAL IMMATURE NEUTOROPHIL: 0.03 K/CU MM
TOTAL NUCLEATED RBC: 0 K/CU MM
WBC # BLD: 7 K/CU MM (ref 4–10.5)

## 2023-09-08 PROCEDURE — 85025 COMPLETE CBC W/AUTO DIFF WBC: CPT

## 2023-09-08 PROCEDURE — G0378 HOSPITAL OBSERVATION PER HR: HCPCS

## 2023-09-08 PROCEDURE — 6360000002 HC RX W HCPCS: Performed by: INTERNAL MEDICINE

## 2023-09-08 PROCEDURE — 80048 BASIC METABOLIC PNL TOTAL CA: CPT

## 2023-09-08 PROCEDURE — 96372 THER/PROPH/DIAG INJ SC/IM: CPT

## 2023-09-08 PROCEDURE — 83605 ASSAY OF LACTIC ACID: CPT

## 2023-09-08 PROCEDURE — 6370000000 HC RX 637 (ALT 250 FOR IP): Performed by: PHYSICIAN ASSISTANT

## 2023-09-08 PROCEDURE — 6370000000 HC RX 637 (ALT 250 FOR IP): Performed by: INTERNAL MEDICINE

## 2023-09-08 PROCEDURE — 96374 THER/PROPH/DIAG INJ IV PUSH: CPT

## 2023-09-08 PROCEDURE — 94761 N-INVAS EAR/PLS OXIMETRY MLT: CPT

## 2023-09-08 PROCEDURE — 96361 HYDRATE IV INFUSION ADD-ON: CPT

## 2023-09-08 PROCEDURE — 2580000003 HC RX 258: Performed by: INTERNAL MEDICINE

## 2023-09-08 PROCEDURE — 83036 HEMOGLOBIN GLYCOSYLATED A1C: CPT

## 2023-09-08 PROCEDURE — 36415 COLL VENOUS BLD VENIPUNCTURE: CPT

## 2023-09-08 RX ORDER — FAMOTIDINE 20 MG/1
40 TABLET, FILM COATED ORAL NIGHTLY
Status: DISCONTINUED | OUTPATIENT
Start: 2023-09-08 | End: 2023-09-08 | Stop reason: HOSPADM

## 2023-09-08 RX ORDER — OXYBUTYNIN CHLORIDE 5 MG/1
5 TABLET ORAL 3 TIMES DAILY
Status: DISCONTINUED | OUTPATIENT
Start: 2023-09-08 | End: 2023-09-08 | Stop reason: HOSPADM

## 2023-09-08 RX ORDER — FLUOXETINE 10 MG/1
10 CAPSULE ORAL DAILY
Status: DISCONTINUED | OUTPATIENT
Start: 2023-09-08 | End: 2023-09-08 | Stop reason: HOSPADM

## 2023-09-08 RX ORDER — OXYCODONE HYDROCHLORIDE 5 MG/1
5 TABLET ORAL EVERY 4 HOURS PRN
Status: DISCONTINUED | OUTPATIENT
Start: 2023-09-08 | End: 2023-09-08 | Stop reason: HOSPADM

## 2023-09-08 RX ORDER — OXYCODONE HYDROCHLORIDE 5 MG/1
5 TABLET ORAL EVERY 8 HOURS PRN
Qty: 12 TABLET | Refills: 0 | Status: SHIPPED | OUTPATIENT
Start: 2023-09-08 | End: 2023-09-13

## 2023-09-08 RX ORDER — ACETAMINOPHEN 325 MG/1
650 TABLET ORAL EVERY 4 HOURS PRN
Status: DISCONTINUED | OUTPATIENT
Start: 2023-09-08 | End: 2023-09-08 | Stop reason: HOSPADM

## 2023-09-08 RX ORDER — PHENAZOPYRIDINE HYDROCHLORIDE 200 MG/1
200 TABLET, FILM COATED ORAL 3 TIMES DAILY PRN
Qty: 10 TABLET | Refills: 0 | Status: SHIPPED | OUTPATIENT
Start: 2023-09-08 | End: 2023-09-11

## 2023-09-08 RX ORDER — OXYBUTYNIN CHLORIDE 5 MG/1
5 TABLET ORAL 3 TIMES DAILY
Qty: 30 TABLET | Refills: 0 | Status: SHIPPED | OUTPATIENT
Start: 2023-09-08 | End: 2023-09-18

## 2023-09-08 RX ORDER — CEFUROXIME AXETIL 500 MG/1
500 TABLET ORAL 2 TIMES DAILY
Qty: 10 TABLET | Refills: 0 | Status: SHIPPED | OUTPATIENT
Start: 2023-09-08 | End: 2023-09-13

## 2023-09-08 RX ORDER — POTASSIUM CHLORIDE 20 MEQ/1
40 TABLET, EXTENDED RELEASE ORAL ONCE
Status: COMPLETED | OUTPATIENT
Start: 2023-09-08 | End: 2023-09-08

## 2023-09-08 RX ORDER — PHENAZOPYRIDINE HYDROCHLORIDE 100 MG/1
200 TABLET, FILM COATED ORAL
Status: DISCONTINUED | OUTPATIENT
Start: 2023-09-08 | End: 2023-09-08 | Stop reason: HOSPADM

## 2023-09-08 RX ORDER — ONDANSETRON 4 MG/1
4 TABLET, ORALLY DISINTEGRATING ORAL EVERY 8 HOURS PRN
Qty: 30 TABLET | Refills: 0 | Status: SHIPPED | OUTPATIENT
Start: 2023-09-08

## 2023-09-08 RX ADMIN — SODIUM CHLORIDE: 9 INJECTION, SOLUTION INTRAVENOUS at 04:12

## 2023-09-08 RX ADMIN — ONDANSETRON 4 MG: 2 INJECTION INTRAMUSCULAR; INTRAVENOUS at 09:35

## 2023-09-08 RX ADMIN — SODIUM CHLORIDE, PRESERVATIVE FREE 10 ML: 5 INJECTION INTRAVENOUS at 09:37

## 2023-09-08 RX ADMIN — OXYCODONE HYDROCHLORIDE 5 MG: 5 TABLET ORAL at 09:35

## 2023-09-08 RX ADMIN — OXYBUTYNIN CHLORIDE 5 MG: 5 TABLET ORAL at 09:34

## 2023-09-08 RX ADMIN — POTASSIUM CHLORIDE 40 MEQ: 1500 TABLET, EXTENDED RELEASE ORAL at 09:34

## 2023-09-08 RX ADMIN — ENOXAPARIN SODIUM 40 MG: 100 INJECTION SUBCUTANEOUS at 09:36

## 2023-09-08 RX ADMIN — TAMSULOSIN HYDROCHLORIDE 0.4 MG: 0.4 CAPSULE ORAL at 09:34

## 2023-09-08 ASSESSMENT — PAIN DESCRIPTION - LOCATION: LOCATION: ABDOMEN

## 2023-09-08 ASSESSMENT — PAIN DESCRIPTION - ORIENTATION: ORIENTATION: MID;LOWER

## 2023-09-08 ASSESSMENT — PAIN SCALES - GENERAL: PAINLEVEL_OUTOF10: 5

## 2023-09-08 ASSESSMENT — PAIN DESCRIPTION - DESCRIPTORS: DESCRIPTORS: CRAMPING

## 2023-09-08 NOTE — PROGRESS NOTES
Outpatient Pharmacy Progress Note for Meds-to-Beds    Total number of Prescriptions Filled: 5  The following medications were dispensed to the patient during the discharge process:  Cefuroxime  Oxybutynin  Oxycodone  Phenazopyridine  ondansetron      Additional Documentation:  Patient picked-up the medication(s) in the OP Pharmacy      Thank you for letting us serve your patients.   [address]    [address] [place] [address], [address] [place] [address]    Phone: [phone]    Fax: [phone]

## 2023-09-08 NOTE — DISCHARGE SUMMARY
V2.0  Discharge Summary    Name:  Suzan Davis /Age/Sex: 1970 (47 y.o. female)   Admit Date: 2023  Discharge Date: 23    MRN & CSN:  [id number] & [id number] Encounter Date and Time 23 9:20 AM EDT    Attending:  Brittni Narvaez MD Discharging Provider: Brittni Narvaez MD       Hospital Course:     Brief HPI: Suzan Davis is a 47 y.o. female who presented with abdominal pain radiating to the left side and emesis. In the ED she was found to have a kidney stone, and was admitted. Brief Problem Based Course: For this admission she was here from  until . She went to the OR from ED and had a left ureteral stent placed. She is now doing well, was discharged home in stable condition. Problem list    Left ureteral calculus with renal colic  -Calculus measures 4 mm and she was unable to pass it and had evidence of left hydronephrosis  -S/p holmium laser manipulation and extraction of stone fragments  in OR on 2023 by Dr. Lidia Obregon was extracted using a basket.   No stone burden was seen to be remaining per urology  -A ureteral stent was then placed  -She needs to follow-up with urology in 1 week for cystoscopy and left ureteral stent removal  -Urology recommended discharging her on Ceftin    Calcium oxalate kidney stone-kidney stone analysis after discharge came back 90% calcium oxalate and 10% calcium phosphate-follow-up with urology    Left renal lesion  -CT scan done in the ED showed a high attenuation left renal focus measuring 2.3 cm, new from a remote exam done in , possibly hyperdense cyst  -Her father had kidney cancer  -Urology recommends outpatient renal protocol CT or MRI in the near future    Abnormal urinalysis with possible UTI-discharged on Ceftin  -Urine culture was requested, but no result is available in the system    Lactic gkkugyqj-qpsfwsuc-wlazv have been from dehydration    Hypokalemia-potassium level was 3.1 upon Reason for exam:->left flank pain, vomiting, diarrhea, syncope Additional Contrast?->None Decision Support Exception - unselect if not a suspected or confirmed emergency medical condition->Emergency Medical Condition (MA) Is the patient pregnant?->No Reason for Exam: left flank pain, vomiting, diarrhea, syncope FINDINGS: Lower Chest: Minimal linear scarring versus atelectasis to the lung bases. No focal consolidations or pleural effusions. Heart is normal in size. No pericardial effusion. Organs: There is a 3-4 mm stone in the distal left ureter at the UVJ with mild upstream obstructive uropathy. No additional urinary tract stones are seen on either side. High attenuation left renal focus measuring 1.9 x 2.3 cm with Hounsfield units in the 50's, new from prior exam (reference image 49, axial sequence, series 2). Unremarkable unenhanced liver, spleen, pancreas, adrenal glands and right kidney. Interval cholecystectomy as compared to prior exam. GI/Bowel: No evidence for bowel obstruction or definite bowel wall thickening to un opacified large or small bowel. Colonic diverticulosis without evidence for diverticulitis. No evidence for acute appendicitis. Trace hiatal hernia. Pelvis: Unremarkable urinary bladder. Prior hysterectomy. No adnexal masses are seen. Multiple phleboliths in the pelvis. Peritoneum/Retroperitoneum: No ascites or focal fluid collections. No intraperitoneal free air. No evidence for abdominal aortic aneurysm. No lymphadenopathy. Tiny fat containing periumbilical hernia. Tiny fat containing ventral abdominal wall hernia above the level of the umbilicus. Bones/Soft Tissues: No acute bone or soft tissue abnormality. No suspicious focal bony lesions. There is a 3-4 mm stone in the distal left ureter at the UVJ with mild upstream obstructive uropathy. High attenuation left renal focus measuring 2.3 cm, new from remote exam 10/21/2011.   While this may reflect a hyperdense cyst, 09/07/2023 08:50 AM    RBCUA 14 09/07/2023 08:50 AM    MUCUS RARE 09/07/2023 08:50 AM    TRICHOMONAS NONE SEEN 09/07/2023 08:50 AM    BACTERIA NEGATIVE 09/07/2023 08:50 AM    CLARITYU CLOUDY 09/07/2023 08:50 AM    SPECGRAV 1.015 09/07/2023 08:50 AM    LEUKOCYTESUR NEGATIVE 09/07/2023 08:50 AM    UROBILINOGEN 0.2 09/07/2023 08:50 AM    BILIRUBINUR NEGATIVE 09/07/2023 08:50 AM    BLOODU SMALL NUMBER OR AMOUNT OBSERVED 09/07/2023 08:50 AM    KETUA 40 09/07/2023 08:50 AM     Urine Cultures: No results found for: Carolina Bernardo  Blood Cultures: No results found for: BC  No results found for: BLOODCULT2  Organism: No results found for: ORG    Time Spent Discharging patient *** minutes    Electronically signed by Hayley Bartlett MD on 9/8/2023 at 9:20 AM

## 2023-09-12 LAB
APPEARANCE STONE: NORMAL
COMPN STONE: NORMAL
SPECIMEN WT: 16 MG

## 2023-11-14 ENCOUNTER — TELEPHONE (OUTPATIENT)
Dept: GASTROENTEROLOGY | Age: 53
End: 2023-11-14

## 2023-12-20 RX ORDER — PHENOL 1.4 %
1 AEROSOL, SPRAY (ML) MUCOUS MEMBRANE DAILY
COMMUNITY

## 2023-12-20 RX ORDER — ACETAMINOPHEN 160 MG
TABLET,DISINTEGRATING ORAL DAILY
COMMUNITY

## 2023-12-27 ENCOUNTER — ANESTHESIA EVENT (OUTPATIENT)
Dept: ENDOSCOPY | Age: 53
End: 2023-12-27
Payer: COMMERCIAL

## 2023-12-27 NOTE — PROGRESS NOTES
Spoke with patient and she will arrive at 1015 at [facility] on 12/28/2023 for her procedure at 1145. IV order is in epic.

## 2023-12-28 ENCOUNTER — ANESTHESIA (OUTPATIENT)
Dept: ENDOSCOPY | Age: 53
End: 2023-12-28
Payer: COMMERCIAL

## 2023-12-28 ENCOUNTER — HOSPITAL ENCOUNTER (OUTPATIENT)
Age: 53
Setting detail: OUTPATIENT SURGERY
Discharge: HOME | End: 2023-12-28
Attending: INTERNAL MEDICINE | Admitting: INTERNAL MEDICINE
Payer: COMMERCIAL

## 2023-12-28 VITALS
HEART RATE: 68 BPM | SYSTOLIC BLOOD PRESSURE: 106 MMHG | RESPIRATION RATE: 16 BRPM | HEIGHT: 66 IN | TEMPERATURE: 97.4 F | WEIGHT: 140 LBS | DIASTOLIC BLOOD PRESSURE: 69 MMHG | BODY MASS INDEX: 22.5 KG/M2 | OXYGEN SATURATION: 98 %

## 2023-12-28 DIAGNOSIS — Z12.11 COLON CANCER SCREENING: ICD-10-CM

## 2023-12-28 PROCEDURE — 45385 COLONOSCOPY W/LESION REMOVAL: CPT | Performed by: INTERNAL MEDICINE

## 2023-12-28 PROCEDURE — 7100000011 HC PHASE II RECOVERY - ADDTL 15 MIN: Performed by: INTERNAL MEDICINE

## 2023-12-28 PROCEDURE — 3700000001 HC ADD 15 MINUTES (ANESTHESIA): Performed by: INTERNAL MEDICINE

## 2023-12-28 PROCEDURE — 3700000000 HC ANESTHESIA ATTENDED CARE: Performed by: INTERNAL MEDICINE

## 2023-12-28 PROCEDURE — 2709999900 HC NON-CHARGEABLE SUPPLY: Performed by: INTERNAL MEDICINE

## 2023-12-28 PROCEDURE — 3609010600 HC COLONOSCOPY POLYPECTOMY SNARE/COLD BIOPSY: Performed by: INTERNAL MEDICINE

## 2023-12-28 PROCEDURE — 2580000003 HC RX 258: Performed by: ANESTHESIOLOGY

## 2023-12-28 PROCEDURE — 45380 COLONOSCOPY AND BIOPSY: CPT | Performed by: INTERNAL MEDICINE

## 2023-12-28 PROCEDURE — 6360000002 HC RX W HCPCS: Performed by: NURSE ANESTHETIST, CERTIFIED REGISTERED

## 2023-12-28 PROCEDURE — 7100000010 HC PHASE II RECOVERY - FIRST 15 MIN: Performed by: INTERNAL MEDICINE

## 2023-12-28 RX ORDER — PROPOFOL 10 MG/ML
INJECTION, EMULSION INTRAVENOUS PRN
Status: DISCONTINUED | OUTPATIENT
Start: 2023-12-28 | End: 2023-12-28 | Stop reason: SDUPTHER

## 2023-12-28 RX ORDER — MIDAZOLAM HYDROCHLORIDE 1 MG/ML
INJECTION INTRAMUSCULAR; INTRAVENOUS PRN
Status: DISCONTINUED | OUTPATIENT
Start: 2023-12-28 | End: 2023-12-28 | Stop reason: SDUPTHER

## 2023-12-28 RX ORDER — SODIUM CHLORIDE, SODIUM LACTATE, POTASSIUM CHLORIDE, CALCIUM CHLORIDE 600; 310; 30; 20 MG/100ML; MG/100ML; MG/100ML; MG/100ML
INJECTION, SOLUTION INTRAVENOUS CONTINUOUS
Status: DISCONTINUED | OUTPATIENT
Start: 2023-12-28 | End: 2023-12-28 | Stop reason: HOSPADM

## 2023-12-28 RX ORDER — LIDOCAINE HYDROCHLORIDE 20 MG/ML
INJECTION, SOLUTION INTRAVENOUS PRN
Status: DISCONTINUED | OUTPATIENT
Start: 2023-12-28 | End: 2023-12-28 | Stop reason: SDUPTHER

## 2023-12-28 RX ADMIN — MIDAZOLAM 2 MG: 1 INJECTION INTRAMUSCULAR; INTRAVENOUS at 13:11

## 2023-12-28 RX ADMIN — SODIUM CHLORIDE, POTASSIUM CHLORIDE, SODIUM LACTATE AND CALCIUM CHLORIDE: 600; 310; 30; 20 INJECTION, SOLUTION INTRAVENOUS at 11:06

## 2023-12-28 RX ADMIN — LIDOCAINE HYDROCHLORIDE 50 MG: 20 INJECTION, SOLUTION INTRAVENOUS at 13:28

## 2023-12-28 RX ADMIN — PROPOFOL 280 MG: 10 INJECTION, EMULSION INTRAVENOUS at 13:28

## 2023-12-28 ASSESSMENT — PAIN SCALES - GENERAL: PAINLEVEL_OUTOF10: 0

## 2023-12-28 ASSESSMENT — PAIN - FUNCTIONAL ASSESSMENT: PAIN_FUNCTIONAL_ASSESSMENT: 0-10

## 2023-12-28 NOTE — ANESTHESIA POSTPROCEDURE EVALUATION
Department of Anesthesiology  Postprocedure Note    Patient: Brianna Mitchell  MRN: [id number]  YOB: 1970  Date of evaluation: 12/28/2023    Procedure Summary       Date: 12/28/23 Room / Location: [facility]    Anesthesia Start: 5111 Anesthesia Stop: 5806    Procedure: COLONOSCOPY POLYPECTOMY SNARE/COLD BIOPSY Diagnosis:       Colon cancer screening      (Colon cancer screening [Z12.11])    Surgeons: Damien Davis MD Responsible Provider: Emery Bashir MD    Anesthesia Type: MAC ASA Status: 3            Anesthesia Type: No value filed. Rito Phase I:      Rito Phase II:      Anesthesia Post Evaluation    Patient location during evaluation: bedside  Patient participation: complete - patient participated  Level of consciousness: awake  Pain score: 0  Airway patency: patent  Nausea & Vomiting: no nausea and no vomiting  Cardiovascular status: blood pressure returned to baseline and hemodynamically stable  Respiratory status: acceptable and room air  Hydration status: euvolemic  Pain management: adequate    No notable events documented.

## 2023-12-28 NOTE — DISCHARGE INSTRUCTIONS
equipment. Do not drink any alcoholic beverages. Do not smoke while alone. Avoid making important decisions. Plan to spend a quiet, relaxed evening @ home. Resume normal activities as you begin to feel better. Eat lightly for your first meal, then gradually increase your diet to what is normal for you. In case of nausea, avoid food and drink only clear liquids. Resume food as nausea ceases. Notify your surgeon if you experience fever, chills, large amount of bleeding, difficulty breathing, persistent nausea and vomiting or any other disturbing problem. Call for a follow-up appointment with your surgeon.

## 2023-12-28 NOTE — PROGRESS NOTES
Returned to room 7. Report received from [facility] [place]. Alert and oriented. Respirations even and unlabored. Color pink. Abdomen soft and nontender. Beverage provided. Call light in reach. Son at bedside.

## 2024-01-02 NOTE — RESULT ENCOUNTER NOTE
Surgical pathology from biopsies taken in the colon show no dysplasia, no cancer just some fragments of colonic mucosa.     If you have any questions or concerns please call us back at the office at [phone]

## 2024-01-17 NOTE — ANESTHESIA PRE PROCEDURE
Department of Anesthesiology  Preprocedure Note       Name:  Demi Leal   Age:  53 y.o.  :  1970                                          MRN:  [id number]         Date:  2024      Surgeon: Surgeon(s):  Mikala Bucio MD    Procedure: Procedure(s):  COLONOSCOPY POLYPECTOMY SNARE/COLD BIOPSY    Medications prior to admission:   Prior to Admission medications    Medication Sig Start Date End Date Taking? Authorizing Provider   Cholecalciferol (VITAMIN D3) 50 MCG (2000) CAPS Take by mouth daily   Yes Mtia Post MD   calcium carbonate 600 MG TABS tablet Take 1 tablet by mouth daily   Yes Mita Post MD   oxybutynin (DITROPAN) 5 MG tablet Take 1 tablet by mouth 3 times daily for 10 days 23  Kirby Wiley MD   ondansetron (ZOFRAN-ODT) 4 MG disintegrating tablet Take 1 tablet by mouth every 8 hours as needed for Nausea or Vomiting 23   Kirby Wiley MD   FLUoxetine (PROZAC) 10 MG capsule Take 1 capsule by mouth daily    Mita Post MD   famotidine (PEPCID) 40 MG tablet Take 1 tablet by mouth nightly  Patient not taking: Reported on 2023    Mita Post MD   estradiol (ESTRACE) 2 MG tablet Take 2 tablets by mouth daily    Mita Post MD   ondansetron (ZOFRAN-ODT) 4 MG disintegrating tablet Take 1 tablet by mouth 3 times daily as needed for Nausea or Vomiting (Headache) 20   Siddharth Henao MD   triamterene-hydrochlorothiazide (DYAZIDE) 37.5-25 MG per capsule Take 1 capsule by mouth daily 17   Eagle Hernandez PA-C   ibuprofen (ADVIL) 200 MG CAPS Take 1 capsule by mouth as needed    ProviderMita MD       Current medications:    No current facility-administered medications for this encounter.     Current Outpatient Medications   Medication Sig Dispense Refill   • Cholecalciferol (VITAMIN D3) 50 MCG ( UT) CAPS Take by mouth daily     • calcium carbonate 600 MG TABS tablet Take 1 tablet by mouth daily

## 2024-01-27 PROBLEM — Z12.11 COLON CANCER SCREENING: Status: RESOLVED | Noted: 2023-12-28 | Resolved: 2024-01-27

## 2024-01-29 NOTE — PROGRESS NOTES
"Progress Note    Date: 01/29/2024  Time In: 9:00  Time Out: 9:56    Patient Legal Name: Wanda Simms  Patient Age: 59 y.o.  Referring Provider:   Olive De La Torre Pa-c [address]  [address]  [place] [address]     Mode of visit: In person  Location of provider: [address], [place] [address]  Location of patient: Office    CHIEF COMPLAINT: Depression, anxiety, grief    Subjective   History of Present Illness     Wanda is a 59 y.o. female presenting for initial session with this therapist. The focus of this session was on building rapport and gathering patient hx. Patient openly shared details of her family hx and current mental health concerns. Patient has lost both of her parents in the last 5 years and she was their primary caregiver. Patient reports she is the youngest of 6 children and that she is  with 2 adult children. She advised she does not have a relationship with her siblings at this time. Patient shared that she experienced SA as a child by her oldest half-brother. Patient has been to grief counseling with Greta for a year due to grief over the loss of her mom. \"She did not have to die.\" Patient expressed concerns that her mother's death was due to medical error. Patient is experiencing less isolation and panic and a little better overall daily functioning since starting to see Olive. She reports still having at least 1 panic attack per week. She disclosed increased frustration with weight gain in spite of eating healthy and exercising. \"The weight gain is depressing.\" Patient is voluntarily requesting to participate in outpatient therapy at [facility].      History obtained from referring provider's note on 1/23/24:  Past Psychiatric History:  Began Treatment: Several months ago  Diagnoses: She reports being diagnosed with both ADHD and bipolar by her PCP several months ago.  Psychiatrist: Denies  Therapist: Denies  Admission History: " 4 Eyes Skin Assessment     NAME:  Demi Leal  YOB: 1970  MEDICAL RECORD NUMBER:  [id number]    The patient is being assessed for  Admission    I agree that at least one RN has performed a thorough Head to Toe Skin Assessment on the patient. ALL assessment sites listed below have been assessed. Areas assessed by both nurses:    Head, Face, Ears, Shoulders, Back, Chest, Arms, Elbows, Hands, Sacrum. Buttock, Coccyx, Ischium, Legs. Feet and Heels, and Under Medical Devices         Does the Patient have a Wound?  No noted wound(s)       Vivek Prevention initiated by RN: No  Wound Care Orders initiated by RN: No    Pressure Injury (Stage 3,4, Unstageable, DTI, NWPT, and Complex wounds) if present, place Wound referral order by RN under : No    New Ostomies, if present place, Ostomy referral order under : No     Nurse 1 eSignature: Electronically signed by Allyn Bloch, LPN on 5/6/27 at 9:20 AM EDT    **SHARE this note so that the co-signing nurse can place an eSignature**    Nurse 2 eSignature: Electronically signed by Romel Martinez RN on 9/8/23 at 3:49 AM EDT Denies  Medications/Treatment: Wellbutrin, Cymbalta (for shingles treatment), trazodone, ambien, restoril, melatonin, Strattera, Vyvanse, Abilify   Self Harm: Denies  Suicide Attempts: Denies  Postpartum depression: Denies      Assessment    Mental Status Exam     Appearance: good hygiene and dressed appropriately for the weather  Behavior: calm  Cooperation:  engaged, cooperative, attentive, and friendly  Eye Contact:  good  Affect:  sad and tearful  Mood: sad and depressed  Speech: responsive and talkative  Thought Process:  organized  Thought Content: appropriate  Suicidal: denies  Homicidal:  denies  Hallucinations:  denies  Memory:  intact  Orientation:  person, place, time, and situation  Reliability:  reliable  Insight:  fair  Judgment:  fair     Patient was tearful throughout session as she spoke about her parents and her history.    Clinical Intervention       ICD-10-CM ICD-9-CM   1. Persistent complex bereavement disorder  F43.81 309.0   2. Severe episode of recurrent major depressive disorder, without psychotic features  F33.2 296.33   3. Generalized anxiety disorder  F41.1 300.02        Individual psychotherapy was provided utilizing CBT and person-centered techniques to build rapport, encourage expression of thoughts and feelings, support self-esteem, establish new coping skills, identify goals for treatment, acknowledge sources of feelings and behaviors, build confidence, assess symptoms, gather history, and provide support. Therapist utilized open-ended questions to encourage the development of a positive therapeutic relationship and open communication.  Therapist normalized/validated patient’s thoughts and feelings as appropriate. Therapist briefly provided psychoeducation regarding CBT fundamental concepts. Introduced cognitive reframing techniques to counteract negative thinking.   Plan   Plan & Goals     Continue building rapport and review concepts presented today. Therapist will provide further  information about CBT and how it will be used in treatment.     Patient acknowledged and verbally consented to continue working toward resolving current treatment plan goals and was educated on the importance of participation in the therapeutic process.  Patient will remain compliant with medication regimen as prescribed. Discuss any medication side effects, questions or concerns with prescribing provider.  Call 911 or present to the nearest emergency room in an emergency situation.   National Suicide Prevention Lifeline: Call 988. The Lifeline provides 24/7, free and confidential support for people in distress, prevention and crisis resources.  Crisis Text Line  Text HOME To 666530    Return in about 2 weeks (around 2/12/2024).    ____________________  This document has been electronically signed by Shweta Christian LCSW  January 29, 2024 13:30 EST    Part of this note may be an electronic transcription/translation of spoken language to printed text using the Dragon Dictation System.

## 2024-04-01 ENCOUNTER — HOSPITAL ENCOUNTER (EMERGENCY)
Age: 54
Discharge: HOME | End: 2024-04-01
Payer: COMMERCIAL

## 2024-04-01 PROCEDURE — 85025 COMPLETE CBC W/AUTO DIFF WBC: CPT

## 2024-04-01 PROCEDURE — 80053 COMPREHEN METABOLIC PANEL: CPT

## 2024-04-24 NOTE — PROGRESS NOTES
Patient will arrive at 1045 at [facility] on 4/25/2024 for her procedure at 1215.    NOTHING TO EAT OR DRINK AFTER MIDNIGHT DAY OF SURGERY    1. Enter thru the hospital main entrance on day of surgery, check in at the Information Desk. If you arrive prior to 6:00am, enter thru the ER entrance.    2. Follow the directions as prescribed by the doctor for your procedure and medications.         Morning of surgery take:pepcid and prozac.         Stop vitamins, supplements and NSAIDS:      3. Check with your Doctor regarding stopping blood thinners and follow their instructions.    4. Do not smoke, vape or use chewing tobacco morning of surgery. Do not drink any alcoholic beverages 24 hours prior to surgery.       This includes NA Beer. No street drugs 7 days prior to surgery.    5. If you have dentures, contacts of glasses they will be removed before going to the OR; please bring a case.    6. Please bring picture ID, insurance card, paperwork from the doctor’s office (H & P, Consent, & card for implantable devices).    7. Take a shower with an antibacterial soap the night before surgery and the morning of surgery. Do not put anything on your skin      After your morning shower.    8. You will need a responsible adult to drive you home and check on you after surgery.

## 2024-04-25 ENCOUNTER — ANESTHESIA EVENT (OUTPATIENT)
Dept: ENDOSCOPY | Age: 54
End: 2024-04-25
Payer: COMMERCIAL

## 2024-04-25 ENCOUNTER — HOSPITAL ENCOUNTER (OUTPATIENT)
Age: 54
Setting detail: OUTPATIENT SURGERY
Discharge: HOME | End: 2024-04-25
Attending: SPECIALIST | Admitting: SPECIALIST
Payer: COMMERCIAL

## 2024-04-25 ENCOUNTER — ANESTHESIA (OUTPATIENT)
Dept: ENDOSCOPY | Age: 54
End: 2024-04-25
Payer: COMMERCIAL

## 2024-04-25 VITALS
HEIGHT: 66 IN | HEART RATE: 74 BPM | BODY MASS INDEX: 22.5 KG/M2 | DIASTOLIC BLOOD PRESSURE: 66 MMHG | RESPIRATION RATE: 16 BRPM | TEMPERATURE: 97.2 F | WEIGHT: 140 LBS | OXYGEN SATURATION: 100 % | SYSTOLIC BLOOD PRESSURE: 102 MMHG

## 2024-04-25 DIAGNOSIS — R63.4 WEIGHT LOSS: ICD-10-CM

## 2024-04-25 DIAGNOSIS — R11.0 NAUSEA: ICD-10-CM

## 2024-04-25 DIAGNOSIS — R10.10 UPPER ABDOMINAL PAIN: ICD-10-CM

## 2024-04-25 PROCEDURE — 88305 TISSUE EXAM BY PATHOLOGIST: CPT

## 2024-04-25 PROCEDURE — 3700000001 HC ADD 15 MINUTES (ANESTHESIA): Performed by: SPECIALIST

## 2024-04-25 PROCEDURE — 6360000002 HC RX W HCPCS: Performed by: NURSE ANESTHETIST, CERTIFIED REGISTERED

## 2024-04-25 PROCEDURE — 3609012400 HC EGD TRANSORAL BIOPSY SINGLE/MULTIPLE: Performed by: SPECIALIST

## 2024-04-25 PROCEDURE — 7100000010 HC PHASE II RECOVERY - FIRST 15 MIN: Performed by: SPECIALIST

## 2024-04-25 PROCEDURE — 2500000003 HC RX 250 WO HCPCS: Performed by: NURSE ANESTHETIST, CERTIFIED REGISTERED

## 2024-04-25 PROCEDURE — 88342 IMHCHEM/IMCYTCHM 1ST ANTB: CPT

## 2024-04-25 PROCEDURE — 7100000011 HC PHASE II RECOVERY - ADDTL 15 MIN: Performed by: SPECIALIST

## 2024-04-25 PROCEDURE — 2709999900 HC NON-CHARGEABLE SUPPLY: Performed by: SPECIALIST

## 2024-04-25 PROCEDURE — 3700000000 HC ANESTHESIA ATTENDED CARE: Performed by: SPECIALIST

## 2024-04-25 PROCEDURE — 2580000003 HC RX 258: Performed by: ANESTHESIOLOGY

## 2024-04-25 RX ORDER — PROPOFOL 10 MG/ML
INJECTION, EMULSION INTRAVENOUS PRN
Status: DISCONTINUED | OUTPATIENT
Start: 2024-04-25 | End: 2024-04-25 | Stop reason: SDUPTHER

## 2024-04-25 RX ORDER — SODIUM CHLORIDE, SODIUM LACTATE, POTASSIUM CHLORIDE, CALCIUM CHLORIDE 600; 310; 30; 20 MG/100ML; MG/100ML; MG/100ML; MG/100ML
INJECTION, SOLUTION INTRAVENOUS CONTINUOUS
Status: DISCONTINUED | OUTPATIENT
Start: 2024-04-25 | End: 2024-04-25 | Stop reason: HOSPADM

## 2024-04-25 RX ORDER — LIDOCAINE HYDROCHLORIDE 20 MG/ML
INJECTION, SOLUTION EPIDURAL; INFILTRATION; INTRACAUDAL; PERINEURAL PRN
Status: DISCONTINUED | OUTPATIENT
Start: 2024-04-25 | End: 2024-04-25 | Stop reason: SDUPTHER

## 2024-04-25 RX ADMIN — SODIUM CHLORIDE, POTASSIUM CHLORIDE, SODIUM LACTATE AND CALCIUM CHLORIDE: 600; 310; 30; 20 INJECTION, SOLUTION INTRAVENOUS at 11:28

## 2024-04-25 RX ADMIN — PROPOFOL 100 MG: 10 INJECTION, EMULSION INTRAVENOUS at 12:14

## 2024-04-25 RX ADMIN — PROPOFOL 20 MG: 10 INJECTION, EMULSION INTRAVENOUS at 12:15

## 2024-04-25 RX ADMIN — PROPOFOL 80 MG: 10 INJECTION, EMULSION INTRAVENOUS at 12:17

## 2024-04-25 RX ADMIN — LIDOCAINE HYDROCHLORIDE 100 MG: 20 INJECTION, SOLUTION EPIDURAL; INFILTRATION; INTRACAUDAL; PERINEURAL at 12:14

## 2024-04-25 ASSESSMENT — PAIN - FUNCTIONAL ASSESSMENT: PAIN_FUNCTIONAL_ASSESSMENT: 0-10

## 2024-04-25 NOTE — PROCEDURES
[address] [place] [address]                             PROCEDURE NOTE      PATIENT NAME: CLIFF CIFUENTES            : 1970  MED REC NO: [id number]                      ROOM: [facility]  ACCOUNT NO: [id number]                       ADMIT DATE: 2024  PROVIDER: Odette Otoole MD      DATE OF PROCEDURE:      SURGEON:  Odette Otoole MD    PROCEDURE PERFORMED:  Esophagogastroduodenoscopy with biopsy.    CHIEF COMPLAINT:  Epigastric pain with weight loss.    PREMEDICATION:  Please refer to the anesthesiologist's notes.    DESCRIPTION OF PROCEDURE:  The patient was placed in the left lateral decubitus position and the video Olympus gastroscope was introduced in back of the throat and was advanced into the esophagus.    Esophagus:  The mucosa of the esophagus was unremarkable.  There was no evidence of hyperemia, erosion, ulcer, stricture, Narayan esophagus, or mass lesion.  Multiple biopsies were obtained from the mid and distal esophagus and submitted for histopathology to rule out eosinophilic esophagitis.    Stomach:  The fundus, cardia, body, antrum, lesser curvature, greater curvature, and the pyloric regions of the stomach were examined.  The mucosa of the stomach was unremarkable, and no erosion or ulcers were seen.  The gastroscope was retroflexed, and the fundus and cardia were carefully examined, and no mass lesions were seen.  Multiple biopsies were obtained from the body and antrum of the stomach and sent for histopathology to rule out H pylori infection.    Duodenum:  The first and second portions of the duodenum were examined and the mucosa was unremarkable.  Multiple biopsies were obtained and sent for histopathology to rule out celiac disease.    POSTOPERATIVE DIAGNOSIS:  Normal esophagogastroduodenoscopy; biopsies obtained.    RECOMMENDATIONS:    1. Antireflux measures.  2. We will continue present management.  3. We will

## 2024-04-25 NOTE — PROGRESS NOTES
Pt returned to room from  endo   Report received from Gabby  Pt A&O, call light placed within reach, side rails up x's 2  1250 assisted pt up to bathroom and assisted pt to get dressed  1300 Discharge instructions given instructed pt to follow up with Dr Otoole in 8 weeks and informed pt of CT and small bowel follow through will be scheduled per Dr Otoole's office/CS, voiced understanding  1305 Pt escorted to main entrance via wheelchair for discharge.

## 2024-04-25 NOTE — DISCHARGE INSTRUCTIONS
[facility]  [phone]    Do not drive, work around machines or use equipment.  Do not drink any alcoholic beverages.  Do not smoke while alone.  Avoid making important decisions.  Plan to spend a quiet, relaxed evening @ home.  Resume normal activities as you begin to feel better.  Eat lightly for your first meal, then gradually increase your diet to what is normal for you.  In case of nausea, avoid food and drink only clear liquids.  Resume food as nausea ceases.  Notify your surgeon if you experience fever, chills, large amount of bleeding, difficulty breathing, persistent nausea and vomiting or any other disturbing problem.  Call for a follow-up appointment with your surgeon.

## 2024-04-25 NOTE — ANESTHESIA POSTPROCEDURE EVALUATION
Department of Anesthesiology  Postprocedure Note    Patient: Demi Leal  MRN: [id number]  YOB: 1970  Date of evaluation: 4/25/2024    Procedure Summary       Date: 04/25/24 Room / Location: [address]    Anesthesia Start: 1205 Anesthesia Stop: 1231    Procedure: ESOPHAGOGASTRODUODENOSCOPY Diagnosis:       Upper abdominal pain      Nausea      Weight loss      (Upper abdominal pain [R10.10])      (Nausea [R11.0])      (Weight loss [R63.4])    Surgeons: Odette Otoole MD Responsible Provider: Calvin Lucia MD    Anesthesia Type: MAC ASA Status: 2            Anesthesia Type: No value filed.    Rito Phase I: Rito Score: 10    Rito Phase II:      Anesthesia Post Evaluation    Patient location during evaluation: bedside  Patient participation: complete - patient participated  Level of consciousness: awake and alert  Pain score: 0  Airway patency: patent  Nausea & Vomiting: no nausea and no vomiting  Cardiovascular status: blood pressure returned to baseline and hemodynamically stable  Respiratory status: acceptable, room air, spontaneous ventilation and nonlabored ventilation  Hydration status: stable  Pain management: adequate    There were no known notable events for this encounter.

## 2024-04-25 NOTE — BRIEF OP NOTE
Brief Postoperative Note      Demi Leal is a 53 y.o. female     Pre-operative Diagnosis: EPIGASTRIC PAIN / WT LOSS    Post-operative Diagnosis: NORMAL EGD    Procedure: EGD WITH BX    Anesthesia: MAC    Surgeons/Assistants:Odette Otoole MD     Estimated Blood Loss: NIL    Complications: None    Specimens: were obtained  REC-- CT ABD / SBFT    Odette Otoole MD   4/25/2024   12:26 PM

## 2024-04-25 NOTE — ANESTHESIA PRE PROCEDURE
BP Readings from Last 3 Encounters:   04/25/24 124/75   12/28/23 106/69   09/08/23 112/80       NPO Status: Time of last liquid consumption: 2300                        Time of last solid consumption: 2300                        Date of last liquid consumption: 04/24/24                        Date of last solid food consumption: 04/24/24    BMI:   Wt Readings from Last 3 Encounters:   04/24/24 63.5 kg (140 lb)   12/20/23 63.5 kg (140 lb)   09/07/23 68.9 kg (152 lb)     Body mass index is 22.6 kg/m².    CBC:   Lab Results   Component Value Date/Time    WBC 7.0 09/08/2023 08:25 AM    RBC 4.16 09/08/2023 08:25 AM    HGB 12.0 09/08/2023 08:25 AM    HCT 38.0 09/08/2023 08:25 AM    MCV 91.3 09/08/2023 08:25 AM    RDW 13.1 09/08/2023 08:25 AM     09/08/2023 08:25 AM       CMP:   Lab Results   Component Value Date/Time     09/08/2023 08:25 AM    K 3.3 09/08/2023 08:25 AM     09/08/2023 08:25 AM    CO2 23 09/08/2023 08:25 AM    BUN 8 09/08/2023 08:25 AM    CREATININE 0.6 09/08/2023 08:25 AM    GFRAA >60 12/16/2020 05:50 PM    LABGLOM >60 09/08/2023 08:25 AM    GLUCOSE 97 09/08/2023 08:25 AM    PROT 6.6 09/07/2023 07:44 AM    PROT 5.9 10/23/2011 04:39 AM    CALCIUM 7.8 09/08/2023 08:25 AM    BILITOT 0.2 09/07/2023 07:44 AM    ALKPHOS 75 09/07/2023 07:44 AM    AST 14 09/07/2023 07:44 AM    ALT 13 09/07/2023 07:44 AM       POC Tests: No results for input(s): \"POCGLU\", \"POCNA\", \"POCK\", \"POCCL\", \"POCBUN\", \"POCHEMO\", \"POCHCT\" in the last 72 hours.    Coags:   Lab Results   Component Value Date/Time    PROTIME 11.6 08/04/2011 09:08 AM    INR 1.06 08/04/2011 09:08 AM    APTT 24.4 08/04/2011 09:08 AM       HCG (If Applicable):   Lab Results   Component Value Date    PREGTESTUR NEGATIVE 04/02/2017        ABGs: No results found for: \"PHART\", \"PO2ART\", \"GGO2ZHR\", \"TYT8DDL\", \"BEART\", \"T1XXSPJH\"     Type & Screen (If Applicable):  No results found for: \"LABABO\",

## 2024-05-15 ENCOUNTER — HOSPITAL ENCOUNTER (OUTPATIENT)
Dept: CT IMAGING | Age: 54
Discharge: HOME | End: 2024-05-15
Attending: SPECIALIST
Payer: COMMERCIAL

## 2024-05-15 DIAGNOSIS — R10.10 UPPER ABDOMINAL PAIN: ICD-10-CM

## 2024-05-15 DIAGNOSIS — R63.4 LOSS OF WEIGHT: ICD-10-CM

## 2024-05-15 DIAGNOSIS — R10.13 ABDOMINAL PAIN, EPIGASTRIC: ICD-10-CM

## 2024-05-15 PROCEDURE — 6360000004 HC RX CONTRAST MEDICATION: Performed by: SPECIALIST

## 2024-05-15 PROCEDURE — 74176 CT ABD & PELVIS W/O CONTRAST: CPT

## 2024-05-15 RX ADMIN — IOPAMIDOL 18 ML: 755 INJECTION, SOLUTION INTRAVENOUS at 10:20

## 2024-10-09 ENCOUNTER — HOSPITAL ENCOUNTER (OUTPATIENT)
Age: 54
Discharge: HOME | End: 2024-10-09
Payer: COMMERCIAL

## 2024-10-09 ENCOUNTER — HOSPITAL ENCOUNTER (OUTPATIENT)
Dept: GENERAL RADIOLOGY | Age: 54
Discharge: HOME | End: 2024-10-09
Payer: COMMERCIAL

## 2024-10-09 DIAGNOSIS — M25.571 RIGHT ANKLE PAIN, UNSPECIFIED CHRONICITY: ICD-10-CM

## 2024-10-09 PROCEDURE — 73610 X-RAY EXAM OF ANKLE: CPT

## 2024-10-21 ENCOUNTER — OFFICE VISIT (OUTPATIENT)
Dept: ORTHOPEDIC SURGERY | Age: 54
End: 2024-10-21
Payer: COMMERCIAL

## 2024-10-21 VITALS
HEIGHT: 66 IN | OXYGEN SATURATION: 99 % | WEIGHT: 148 LBS | RESPIRATION RATE: 14 BRPM | BODY MASS INDEX: 23.78 KG/M2 | HEART RATE: 84 BPM

## 2024-10-21 DIAGNOSIS — S93.401A SEVERE ANKLE SPRAIN, RIGHT, INITIAL ENCOUNTER: Primary | ICD-10-CM

## 2024-10-21 PROCEDURE — 99203 OFFICE O/P NEW LOW 30 MIN: CPT | Performed by: PHYSICIAN ASSISTANT

## 2024-10-21 NOTE — PATIENT INSTRUCTIONS
Patient given a lace up ankle brace today, transition out of the cam boot after 1 more week in the boot and transition into the ankle brace.  Continue working on some gentle range of motion exercises with the ankle.  Anti-inflammatory medication such as ibuprofen for inflammation is also helpful.  Once the pain has nearly subsided then you can start doing the ankle exercises that are provided.  I recommend doing the ankle exercises at least 3 days a week when she started doing them.

## 2024-10-22 ASSESSMENT — ENCOUNTER SYMPTOMS
EYES NEGATIVE: 1
RESPIRATORY NEGATIVE: 1
GASTROINTESTINAL NEGATIVE: 1

## 2024-10-22 NOTE — PROGRESS NOTES
[facility]  I reviewed and agree with the portions of the HPI, review of systems, vital documentation and plan performed by my staff and have added/addended where appropriate.     HPI:  Demi Leal is a 54 y.o. female that presents to the office today complaining of right ankle pain after an injury that she had about 2 weeks ago.  The patient did have a fall where she twisted the right ankle when she was out of town.  She states that she was told to get a she was cam boot from [employer] or from online somewhere and she did do that.  She has been in the cam boot for about a week and a half.  She states that most of her pain is on the lateral aspect of the ankle but she does have some of the medial aspect of the ankle as well.  She rates her pain today at a 5/10 but it is improving.      Past Medical History:   Diagnosis Date    Edema     Hands, Lower Extremities frequently    Hypokalemia 06/2020    Kidney stone     Lactic acid increased     Nausea & vomiting     Severe    PONV (postoperative nausea and vomiting)     Renal mass, left     Swelling, lymph nodes 10/2011    Lymphadema bilateral neck    TIA (transient ischemic attack) 06/2020       Past Surgical History:   Procedure Laterality Date    ABDOMEN SURGERY  01/01/1986    Exploratory Laparotomy    CHOLECYSTECTOMY      COLONOSCOPY N/A 12/28/2023    COLONOSCOPY POLYPECTOMY SNARE/COLD BIOPSY performed by Mikala Bucio MD at [facility] ENDOSCOPY    CYSTOSCOPY Left 09/07/2023    CYSTOSCOPY RETROGRADE PYLEOGRAMS STONE MANIPULATION WITH STENT PLACEMENT performed by Ramón Jackson MD at [facility] OR    DENTAL SURGERY  01/01/1978    Caps On Teeth    DILATION AND CURETTAGE OF UTERUS      X 4 in Past, Last One 2007    HERNIA REPAIR      HYSTERECTOMY (CERVIX STATUS UNKNOWN)  08/01/2011    Robotic Lap Total Hysterectomy    LAPAROSCOPY      X3 Last one 2007, laser     TONSILLECTOMY  01/01/1977    T & A    UPPER GASTROINTESTINAL ENDOSCOPY N/A 4/25/2024

## 2024-10-24 ENCOUNTER — HOSPITAL ENCOUNTER (OUTPATIENT)
Dept: GENERAL RADIOLOGY | Age: 54
Discharge: HOME | End: 2024-10-24
Attending: SPECIALIST
Payer: COMMERCIAL

## 2024-10-24 DIAGNOSIS — R10.13 ABDOMINAL PAIN, EPIGASTRIC: ICD-10-CM

## 2024-10-24 DIAGNOSIS — R63.4 LOSS OF WEIGHT: ICD-10-CM

## 2024-10-24 DIAGNOSIS — R10.10 UPPER ABDOMINAL PAIN: ICD-10-CM

## 2024-10-24 PROCEDURE — 74250 X-RAY XM SM INT 1CNTRST STD: CPT

## 2025-03-09 NOTE — PROGRESS NOTES
Patient Name:  Demi Leal  Patient :  1970  Patient MRN:  [id number]     Primary Oncologist: Rachael Bowens MD  Referring Provider: Dasia Ortez APRN - CNP     Date of Service: 3/10/2025      Chief Complaint:    Chief Complaint   Patient presents with    New Patient         Encounter Diagnosis   Name Primary?    Left renal mass Yes          HPI:   Demi \"Marisa\" Elvis is a pleasant 54 year-old woman presenting alone to the clinic today for an initial consultation for evaluation of kidney mass. Patient has not seen urologist. Has not had an MRI of the abdoment yet. Has seen GI and had an EGF, colonoscopy, as well as small bowel follow through performed. Father with renal cell cancer,  at 67 years. Had surgery performed. Mother  in  from a stroke. Mother diagnosed with oncocytoma of right kidney. No surgery was performed. Son recently graduated and moved back home. Thought her hernia was coming back. Reports nausea, dry heaving. Pain in right upper abdomen and swollen in right area. Reports diarrhea. No problems urinating. No hematuria. Has had blood in stools in the past due to hemorrhoids previously. Left kidney stone in 2023 had stent performed. Reports unintentional weight loss since . Lost about 15 pounds. Reports appetite is well when not in pain. No chest pain, slight SOB when in pain. No fever, no cough. No palpitations. Reports intermittent night sweats. No problems with liver. Slight newly onset headache. No recent vision changes. No focal weakness. Reports ankle and feet LE swelling.    2023 XR CHEST  IMPRESSION:  No acute process.    2023 US CHEST INCLUDING MEDIASTANUM  IMPRESSION:  Degenerative changes of the sternomanubrial joint account for the area of  palpable concern in the midline anterior chest wall.  Of note, no similar  changes were present 2009.    2023 US HEAD NECK SOFT TISSUE THYROID  IMPRESSION:  Unremarkable

## 2025-03-10 ENCOUNTER — HOSPITAL ENCOUNTER (OUTPATIENT)
Dept: INFUSION THERAPY | Age: 55
Discharge: HOME | End: 2025-03-10
Payer: MEDICAID

## 2025-03-10 ENCOUNTER — CLINICAL DOCUMENTATION (OUTPATIENT)
Dept: ONCOLOGY | Age: 55
End: 2025-03-10

## 2025-03-10 ENCOUNTER — HOSPITAL ENCOUNTER (OUTPATIENT)
Dept: CT IMAGING | Age: 55
Discharge: HOME | End: 2025-03-10
Attending: SURGERY

## 2025-03-10 ENCOUNTER — TELEPHONE (OUTPATIENT)
Dept: ONCOLOGY | Age: 55
End: 2025-03-10

## 2025-03-10 ENCOUNTER — OFFICE VISIT (OUTPATIENT)
Dept: SURGERY | Age: 55
End: 2025-03-10
Payer: MEDICAID

## 2025-03-10 ENCOUNTER — INITIAL CONSULT (OUTPATIENT)
Dept: ONCOLOGY | Age: 55
End: 2025-03-10
Payer: MEDICAID

## 2025-03-10 VITALS
HEART RATE: 76 BPM | HEIGHT: 66 IN | WEIGHT: 152.6 LBS | DIASTOLIC BLOOD PRESSURE: 82 MMHG | SYSTOLIC BLOOD PRESSURE: 132 MMHG | OXYGEN SATURATION: 98 % | TEMPERATURE: 98 F | BODY MASS INDEX: 24.53 KG/M2

## 2025-03-10 VITALS
HEIGHT: 66 IN | WEIGHT: 151.3 LBS | DIASTOLIC BLOOD PRESSURE: 82 MMHG | OXYGEN SATURATION: 98 % | HEART RATE: 83 BPM | BODY MASS INDEX: 24.32 KG/M2 | SYSTOLIC BLOOD PRESSURE: 116 MMHG

## 2025-03-10 DIAGNOSIS — N28.89 LEFT RENAL MASS: Primary | ICD-10-CM

## 2025-03-10 DIAGNOSIS — N28.89 LEFT RENAL MASS: ICD-10-CM

## 2025-03-10 DIAGNOSIS — K40.90 NON-RECURRENT UNILATERAL INGUINAL HERNIA WITHOUT OBSTRUCTION OR GANGRENE: Primary | ICD-10-CM

## 2025-03-10 DIAGNOSIS — Z00.6 EXAMINATION FOR NORMAL COMPARISON OR CONTROL IN CLINICAL RESEARCH: ICD-10-CM

## 2025-03-10 LAB
ALBUMIN SERPL-MCNC: 4.1 G/DL (ref 3.4–5)
ALBUMIN/GLOB SERPL: 1.4 {RATIO} (ref 1.1–2.2)
ALP SERPL-CCNC: 84 U/L (ref 40–129)
ALT SERPL-CCNC: 11 U/L (ref 10–40)
ANION GAP SERPL CALCULATED.3IONS-SCNC: 12 MMOL/L (ref 9–17)
AST SERPL-CCNC: 17 U/L (ref 15–37)
BASOPHILS # BLD: 0.02 K/UL (ref 0–0.2)
BASOPHILS NFR BLD: 0 % (ref 0–1)
BILIRUB SERPL-MCNC: <0.2 MG/DL (ref 0–1)
BUN SERPL-MCNC: 17 MG/DL (ref 7–20)
CALCIUM SERPL-MCNC: 9.7 MG/DL (ref 8.3–10.6)
CHLORIDE SERPL-SCNC: 100 MMOL/L (ref 99–110)
CO2 SERPL-SCNC: 27 MMOL/L (ref 21–32)
CREAT SERPL-MCNC: 0.7 MG/DL (ref 0.6–1.1)
EOSINOPHIL # BLD: 0.18 K/UL (ref 0–0.4)
EOSINOPHILS RELATIVE PERCENT: 4 % (ref 0–3)
ERYTHROCYTE [DISTWIDTH] IN BLOOD BY AUTOMATED COUNT: 13.2 % (ref 11.7–14.9)
GFR, ESTIMATED: 82 ML/MIN/1.73M2
GLUCOSE SERPL-MCNC: 99 MG/DL (ref 74–99)
HCT VFR BLD AUTO: 43.5 % (ref 37–47)
HGB BLD-MCNC: 13.9 G/DL (ref 12.5–16)
LDH SERPL-CCNC: 130 U/L (ref 100–190)
LYMPHOCYTES NFR BLD: 1.71 K/UL (ref 1–4.8)
LYMPHOCYTES RELATIVE PERCENT: 34 % (ref 24–44)
MCH RBC QN AUTO: 28.7 PG (ref 27–31)
MCHC RBC AUTO-ENTMCNC: 32 G/DL (ref 32–36)
MCV RBC AUTO: 89.9 FL (ref 78–100)
MONOCYTES NFR BLD: 0.35 K/UL (ref 0.1–0.8)
MONOCYTES NFR BLD: 7 % (ref 0–4)
NEUTROPHILS NFR BLD: 55 % (ref 36–66)
NEUTS SEG NFR BLD: 2.75 K/UL (ref 1.8–7.7)
PLATELET # BLD AUTO: 293 K/UL (ref 140–440)
PMV BLD AUTO: 9.8 FL (ref 7.5–11.1)
POTASSIUM SERPL-SCNC: 4.1 MMOL/L (ref 3.5–5.1)
PROT SERPL-MCNC: 6.9 G/DL (ref 6.4–8.2)
RBC # BLD AUTO: 4.84 M/UL (ref 4.2–5.4)
SODIUM SERPL-SCNC: 139 MMOL/L (ref 136–145)
WBC OTHER # BLD: 5 K/UL (ref 4–10.5)

## 2025-03-10 PROCEDURE — 83615 LACTATE (LD) (LDH) ENZYME: CPT

## 2025-03-10 PROCEDURE — 36415 COLL VENOUS BLD VENIPUNCTURE: CPT

## 2025-03-10 PROCEDURE — 99204 OFFICE O/P NEW MOD 45 MIN: CPT | Performed by: SURGERY

## 2025-03-10 PROCEDURE — 85025 COMPLETE CBC W/AUTO DIFF WBC: CPT

## 2025-03-10 PROCEDURE — 99204 OFFICE O/P NEW MOD 45 MIN: CPT | Performed by: INTERNAL MEDICINE

## 2025-03-10 PROCEDURE — 99202 OFFICE O/P NEW SF 15 MIN: CPT

## 2025-03-10 PROCEDURE — 80053 COMPREHEN METABOLIC PANEL: CPT

## 2025-03-10 RX ORDER — OXYCODONE AND ACETAMINOPHEN 5; 325 MG/1; MG/1
1 TABLET ORAL EVERY 6 HOURS PRN
COMMUNITY

## 2025-03-10 RX ORDER — PANTOPRAZOLE SODIUM 40 MG/1
TABLET, DELAYED RELEASE ORAL
COMMUNITY
Start: 2025-03-07

## 2025-03-10 ASSESSMENT — ENCOUNTER SYMPTOMS
CHOKING: 0
BACK PAIN: 0
ANAL BLEEDING: 0
SORE THROAT: 0
EYE REDNESS: 0
APNEA: 0
CONSTIPATION: 0
RECTAL PAIN: 0
ABDOMINAL PAIN: 1
STRIDOR: 0
COLOR CHANGE: 0
EYE ITCHING: 0
PHOTOPHOBIA: 0

## 2025-03-10 ASSESSMENT — PATIENT HEALTH QUESTIONNAIRE - PHQ9
SUM OF ALL RESPONSES TO PHQ QUESTIONS 1-9: 0
SUM OF ALL RESPONSES TO PHQ QUESTIONS 1-9: 0
1. LITTLE INTEREST OR PLEASURE IN DOING THINGS: NOT AT ALL
2. FEELING DOWN, DEPRESSED OR HOPELESS: NOT AT ALL
SUM OF ALL RESPONSES TO PHQ QUESTIONS 1-9: 0
SUM OF ALL RESPONSES TO PHQ QUESTIONS 1-9: 0

## 2025-03-10 NOTE — PROGRESS NOTES
MA Rooming Questions  Patient: Demi Leal  MRN: [id number]    Date: 3/10/2025      NEW PATIENT     5. Did the patient have a depression screening completed today? Yes    PHQ-9 Total Score: 0 (3/10/2025  8:26 AM)       PHQ-9 Given to (if applicable):               PHQ-9 Score (if applicable):                     [] Positive     []  Negative              Does question #9 need addressed (if applicable)                     [] Yes    []  No               Elizabeth Santos CMA

## 2025-03-10 NOTE — TELEPHONE ENCOUNTER
3/10/25 - spoke to pt for the 3/17/25 arrival time of 9:30 am and NPO 4 no metals to be worn on the body. CT first MRI to follow. I scheduled a follow up on 3/24/25 at 8:30 am. With Dr Bowens.

## 2025-03-10 NOTE — PROGRESS NOTES
Per Dr. Bowens she spoke with Dr. Mccarty and he will see patient today for hernia.  Per Dr. Mccarty patient will be seen 3/10/25 at 13:15 PM. Patient advised.

## 2025-03-10 NOTE — PROGRESS NOTES
Chief Complaint   Patient presents with    New Patient     N/P Hernia, Positive on CT, 3/2/25         SUBJECTIVE:    History of Present Illness  The patient presents for evaluation of a hernia.    She has been experiencing irregular bowel movements, nausea, and pain, which prompted her to seek emergency care. During this visit, a mass was identified in her left kidney. She has an upcoming appointment with Dr. Harding on Wednesday. She has been managing her pain with ibuprofen. She reports that the hernia is slightly above the previous location and is visible protruding, particularly after meals. She has been using an ice machine and an Ace binder for relief. The pain originates from the hernia site and radiates to the right upper quadrant. She attempted to eat a sandwich today to take ibuprofen but felt unwell afterward. Her medical history includes an umbilical hernia repair performed by Dr. Ronnie Moss in  or , a robotic hysterectomy and bilateral salpingo-oophorectomy (BSO) in , and a cholecystectomy in .    SOCIAL HISTORY  She does not drink alcohol.    FAMILY HISTORY  Her father  of renal cell carcinoma.    MEDICATIONS  ibuprofen    I have reviewed the patient's(pertinent information to this visit) medical history, family history(scanned in  the [employer] under \"patient questioner\"), social history and review of systems with the patient today in the office.            Past Surgical History:   Procedure Laterality Date    ABDOMEN SURGERY  1986    Exploratory Laparotomy    CHOLECYSTECTOMY      COLONOSCOPY N/A 2023    COLONOSCOPY POLYPECTOMY SNARE/COLD BIOPSY performed by Mikala Bucio MD at [facility] ENDOSCOPY    CYSTOSCOPY Left 2023    CYSTOSCOPY RETROGRADE PYLEOGRAMS STONE MANIPULATION WITH STENT PLACEMENT performed by Ramón Jackson MD at [facility] OR    DENTAL SURGERY  1978    Caps On Teeth    DILATION AND CURETTAGE OF UTERUS      X 4 in Past, Last One     HERNIA REPAIR

## 2025-03-17 ENCOUNTER — HOSPITAL ENCOUNTER (OUTPATIENT)
Dept: MRI IMAGING | Age: 55
Discharge: HOME | End: 2025-03-17
Attending: INTERNAL MEDICINE
Payer: MEDICAID

## 2025-03-17 ENCOUNTER — HOSPITAL ENCOUNTER (OUTPATIENT)
Dept: CT IMAGING | Age: 55
Discharge: HOME | End: 2025-03-17
Attending: INTERNAL MEDICINE
Payer: MEDICAID

## 2025-03-17 DIAGNOSIS — N28.89 LEFT RENAL MASS: ICD-10-CM

## 2025-03-17 PROCEDURE — 6360000004 HC RX CONTRAST MEDICATION: Performed by: INTERNAL MEDICINE

## 2025-03-17 PROCEDURE — A9577 INJ MULTIHANCE: HCPCS | Performed by: INTERNAL MEDICINE

## 2025-03-17 PROCEDURE — 74183 MRI ABD W/O CNTR FLWD CNTR: CPT

## 2025-03-17 PROCEDURE — 71260 CT THORAX DX C+: CPT

## 2025-03-17 RX ORDER — IOPAMIDOL 755 MG/ML
75 INJECTION, SOLUTION INTRAVASCULAR
Status: COMPLETED | OUTPATIENT
Start: 2025-03-17 | End: 2025-03-17

## 2025-03-17 RX ADMIN — GADOBENATE DIMEGLUMINE 15 ML: 529 INJECTION, SOLUTION INTRAVENOUS at 11:08

## 2025-03-17 RX ADMIN — IOPAMIDOL 75 ML: 755 INJECTION, SOLUTION INTRAVENOUS at 10:27

## 2025-03-24 ENCOUNTER — OFFICE VISIT (OUTPATIENT)
Dept: ONCOLOGY | Age: 55
End: 2025-03-24
Payer: MEDICAID

## 2025-03-24 ENCOUNTER — HOSPITAL ENCOUNTER (OUTPATIENT)
Dept: INFUSION THERAPY | Age: 55
Discharge: HOME | End: 2025-03-24
Payer: MEDICAID

## 2025-03-24 VITALS
BODY MASS INDEX: 24.27 KG/M2 | OXYGEN SATURATION: 98 % | DIASTOLIC BLOOD PRESSURE: 70 MMHG | HEIGHT: 66 IN | WEIGHT: 151 LBS | SYSTOLIC BLOOD PRESSURE: 131 MMHG | TEMPERATURE: 97.6 F | RESPIRATION RATE: 16 BRPM | HEART RATE: 72 BPM

## 2025-03-24 DIAGNOSIS — N28.89 LEFT RENAL MASS: Primary | ICD-10-CM

## 2025-03-24 PROCEDURE — 99212 OFFICE O/P EST SF 10 MIN: CPT

## 2025-03-24 PROCEDURE — 99214 OFFICE O/P EST MOD 30 MIN: CPT | Performed by: INTERNAL MEDICINE

## 2025-03-24 NOTE — PROGRESS NOTES
MA Rooming Questions  Patient: Demi Leal  MRN: [id number]    Date: 3/24/2025        1. Do you have any new issues?   no         2. Do you need any refills on medications?    no    3. Have you had any imaging done since your last visit?   yes - MRI,CT    4. Have you been hospitalized or seen in the emergency room since your last visit here?   no    5. Did the patient have a depression screening completed today? No    No data recorded     PHQ-9 Given to (if applicable):               PHQ-9 Score (if applicable):                     [] Positive     []  Negative              Does question #9 need addressed (if applicable)                     [] Yes    []  No               Kelly Del Castillo CMA      
  Portions of this note are copied forward from previous clinic note.  Interval history, ROS, physical exam, assessment and plan has been reviewed and updated for accuracy by this provider.     Time Spent with patient,  for face to face, exam, education, discussing treatment options, reviewing imaging, reviewing labs, decision making, Pre-charting, counseling and documenting today's visit, > 30 min     Melanie CRUZ am scribing for and in the presence of Rachael Bowens MD. 3/24/25/8:53 AM EDT

## 2025-03-31 ENCOUNTER — HOSPITAL ENCOUNTER (OUTPATIENT)
Dept: WOMENS IMAGING | Age: 55
Discharge: HOME | End: 2025-03-31
Payer: MEDICAID

## 2025-03-31 ENCOUNTER — OFFICE VISIT (OUTPATIENT)
Dept: SURGERY | Age: 55
End: 2025-03-31
Payer: MEDICAID

## 2025-03-31 VITALS
OXYGEN SATURATION: 97 % | SYSTOLIC BLOOD PRESSURE: 122 MMHG | BODY MASS INDEX: 24.59 KG/M2 | WEIGHT: 153 LBS | HEIGHT: 66 IN | DIASTOLIC BLOOD PRESSURE: 80 MMHG | HEART RATE: 78 BPM

## 2025-03-31 DIAGNOSIS — Z12.31 ENCOUNTER FOR SCREENING MAMMOGRAM FOR MALIGNANT NEOPLASM OF BREAST: ICD-10-CM

## 2025-03-31 DIAGNOSIS — K40.90 NON-RECURRENT UNILATERAL INGUINAL HERNIA WITHOUT OBSTRUCTION OR GANGRENE: Primary | ICD-10-CM

## 2025-03-31 DIAGNOSIS — N28.89 LEFT RENAL MASS: ICD-10-CM

## 2025-03-31 PROCEDURE — 99214 OFFICE O/P EST MOD 30 MIN: CPT | Performed by: SURGERY

## 2025-03-31 PROCEDURE — 77063 BREAST TOMOSYNTHESIS BI: CPT

## 2025-04-01 ASSESSMENT — ENCOUNTER SYMPTOMS
ANAL BLEEDING: 0
CHOKING: 0
COLOR CHANGE: 0
EYE ITCHING: 0
STRIDOR: 0
ABDOMINAL PAIN: 1
APNEA: 0
CONSTIPATION: 0
SORE THROAT: 0
EYE REDNESS: 0
BACK PAIN: 0
PHOTOPHOBIA: 0
RECTAL PAIN: 0

## 2025-04-01 NOTE — PROGRESS NOTES
Chief Complaint   Patient presents with    Follow-up     F/U Dual Case AJ - kidney removed  With Hernia Repair Consent         SUBJECTIVE:    History of Present Illness  The patient presents for evaluation of an incisional hernia.    She reports a significant improvement in her pain levels and nausea, attributing this to the use of a specific device. Her appetite has also improved, with an increase in food intake over the past few days compared to previous weeks. She has been managing her symptoms with increased bed rest and the use of an ice machine, which have proven beneficial. She has discontinued her calcium and vitamin D3 supplements as per medical advice received last week.    She has consulted with Dr. Ponce, who identified a deep-seated issue on one side of her kidney. He is considering either a partial or radical approach to address this, depending on the depth of the problem upon further examination. If the issue persists at a significant depth, he may opt for a wedge procedure, a method he has successfully employed in the past.    MEDICATIONS  Discontinued: calcium, vitamin D3    I have reviewed the patient's(pertinent information to this visit) medical history, family history(scanned in  the [employer] under \"patient questioner\"), social history and review of systems with the patient today in the office.            Past Surgical History:   Procedure Laterality Date    ABDOMEN SURGERY  01/01/1986    Exploratory Laparotomy    CHOLECYSTECTOMY      COLONOSCOPY N/A 12/28/2023    COLONOSCOPY POLYPECTOMY SNARE/COLD BIOPSY performed by Mikala Bucio MD at [facility] ENDOSCOPY    CYSTOSCOPY Left 09/07/2023    CYSTOSCOPY RETROGRADE PYLEOGRAMS STONE MANIPULATION WITH STENT PLACEMENT performed by Ramón Jackson MD at [facility] OR    DENTAL SURGERY  01/01/1978    Caps On Teeth    DILATION AND CURETTAGE OF UTERUS      X 4 in Past, Last One 2007    HERNIA REPAIR      HYSTERECTOMY (CERVIX STATUS UNKNOWN)  08/01/2011    Robotic Lap

## 2025-04-07 ENCOUNTER — TELEPHONE (OUTPATIENT)
Dept: SURGERY | Age: 55
End: 2025-04-07

## 2025-04-07 DIAGNOSIS — N28.89 LEFT RENAL MASS: Primary | ICD-10-CM

## 2025-04-07 NOTE — TELEPHONE ENCOUNTER
Demi Leal Patient    Member ID  [id number]  Date of Birth  1970  Gender  Female    Eligibility Status  A  Group Number  [id number]  Plan / Coverage Date  2024-12-01 - 2199-12-31    Transaction Type  Outpatient Authorization  Organization  [facility]  Payer  Critical access hospital      Service Information  []Show Optional Fields  Service Type *   2 - Surgical  Service Type *  [facility]  Place of Service *  From Date *   To Date *  Quantity *     Quantity Type *  Units  Quantity Type *                     Level Of Service *  Elective  Level Of Service *                   Diagnosis Code(s)  []Show Optional Fields  Diagnosis Code *    - Unil inguinal hernia w/o obst or gangr not spcf as recur  Diagnosis Code * *   Add another diagnosis code  Procedure Code(s)  []Show Optional Fields  Procedure Code *   73314 - RPR AA HRN 1ST < 3 CM RDC  Procedure Code *   Add a modifier code (optional)  Procedure Service Quantity *     Procedure Service Quantity Type *  Units  Procedure Service Quantity Type *                Add another procedure code  Message  []Show Optional Fields  Provider Notes optional    264 Remaining  ×    Procedure Code 1: No authorization is required for this code unless this request is related to services over the maximum benefit. If this auth request is related to services over benefit limits, please submit your request directly to your health plan.

## 2025-04-09 ENCOUNTER — PREP FOR PROCEDURE (OUTPATIENT)
Dept: SURGERY | Age: 55
End: 2025-04-09

## 2025-04-09 ENCOUNTER — HOSPITAL ENCOUNTER (OUTPATIENT)
Dept: WOMENS IMAGING | Age: 55
Discharge: HOME | End: 2025-04-09
Payer: MEDICAID

## 2025-04-09 ENCOUNTER — HOSPITAL ENCOUNTER (OUTPATIENT)
Dept: ULTRASOUND IMAGING | Age: 55
Discharge: HOME | End: 2025-04-09
Payer: MEDICAID

## 2025-04-09 DIAGNOSIS — R92.8 ABNORMAL MAMMOGRAM: ICD-10-CM

## 2025-04-09 DIAGNOSIS — N28.89 OTHER SPECIFIED DISORDERS OF KIDNEY AND URETER: ICD-10-CM

## 2025-04-09 PROBLEM — K40.90 UNILATERAL INGUINAL HERNIA WITHOUT OBSTRUCTION OR GANGRENE: Status: ACTIVE | Noted: 2025-04-09

## 2025-04-09 PROCEDURE — G0279 TOMOSYNTHESIS, MAMMO: HCPCS

## 2025-04-09 PROCEDURE — 76642 ULTRASOUND BREAST LIMITED: CPT

## 2025-04-10 PROBLEM — N28.89 OTHER SPECIFIED DISORDERS OF KIDNEY AND URETER: Status: ACTIVE | Noted: 2025-04-09

## 2025-04-11 ENCOUNTER — HOSPITAL ENCOUNTER (OUTPATIENT)
Dept: MRI IMAGING | Age: 55
Discharge: HOME | End: 2025-04-11
Attending: INTERNAL MEDICINE
Payer: MEDICAID

## 2025-04-11 DIAGNOSIS — N28.89 LEFT RENAL MASS: ICD-10-CM

## 2025-04-11 PROCEDURE — 70553 MRI BRAIN STEM W/O & W/DYE: CPT

## 2025-04-11 PROCEDURE — A9579 GAD-BASE MR CONTRAST NOS,1ML: HCPCS | Performed by: INTERNAL MEDICINE

## 2025-04-11 PROCEDURE — 6360000004 HC RX CONTRAST MEDICATION: Performed by: INTERNAL MEDICINE

## 2025-04-11 RX ADMIN — GADOTERIDOL 14 ML: 279.3 INJECTION, SOLUTION INTRAVENOUS at 11:54

## 2025-04-15 NOTE — PROGRESS NOTES
4/15/25 - .LM with my call-back # concerning  surgery @ [facility] on  4/28/25.  Please call the PAT Nurse for a phone assessment, surgery instructions and to come in for pre-surgery labs  Update @ 7812 - Patient returned my call, PAT is done.       .Surgery @ [facility] on 4/28/25 you will be called 4/25/25 with times    NOTHING TO EAT OR DRINK AFTER MIDNIGHT DAY OF SURGERY    1. Enter thru the hospital main entrance on day of surgery, check in at the Information Desk. If you arrive prior to 6:00am, enter thru the ER entrance.    2. Follow the directions as prescribed by the doctor for your procedure and medications.         Morning of surgery take:  Pantoprazole with sips of water         Stop vitamins, supplements and NSAIDS:  4/21/25    3. Check with your Doctor regarding stopping blood thinners and follow their instructions.    4. Do not smoke, vape or use chewing tobacco morning of surgery. Do not drink any alcoholic beverages 24 hours prior to surgery.       This includes NA Beer. No street drugs 7 days prior to surgery.    5. If you have dentures, contacts of glasses they will be removed before going to the OR; please bring a case.    6. Please bring picture ID, insurance card, paperwork from the doctor’s office (H & P, Consent, & card for implantable devices).    7. Take a shower with an antibacterial soap the night before surgery and the morning of surgery. Do not put anything on your skin      After your morning shower.    8. You will need a responsible adult to drive you home and check on you after surgery.

## 2025-04-16 NOTE — PROGRESS NOTES
LVM asking patient a good day for her to come and get pre admission testing done prior to her surgery on 4/28/25. Call back number left.

## 2025-04-22 ENCOUNTER — TELEPHONE (OUTPATIENT)
Dept: SURGERY | Age: 55
End: 2025-04-22

## 2025-04-22 ENCOUNTER — HOSPITAL ENCOUNTER (OUTPATIENT)
Age: 55
Discharge: HOME | End: 2025-04-22
Payer: MEDICAID

## 2025-04-22 DIAGNOSIS — Z01.818 PRE-OP TESTING: ICD-10-CM

## 2025-04-22 LAB
ANION GAP SERPL CALCULATED.3IONS-SCNC: 11 MMOL/L (ref 9–17)
BASOPHILS # BLD: 0.05 K/UL
BASOPHILS NFR BLD: 1 % (ref 0–1)
BILIRUB UR QL STRIP: NEGATIVE
BUN SERPL-MCNC: 13 MG/DL (ref 7–20)
CALCIUM SERPL-MCNC: 9.4 MG/DL (ref 8.3–10.6)
CHLORIDE SERPL-SCNC: 100 MMOL/L (ref 99–110)
CLARITY UR: CLEAR
CO2 SERPL-SCNC: 27 MMOL/L (ref 21–32)
COLOR UR: YELLOW
COMMENT: ABNORMAL
CREAT SERPL-MCNC: 0.7 MG/DL (ref 0.6–1.1)
EOSINOPHIL # BLD: 0.2 K/UL
EOSINOPHILS RELATIVE PERCENT: 4 % (ref 0–3)
ERYTHROCYTE [DISTWIDTH] IN BLOOD BY AUTOMATED COUNT: 13.2 % (ref 11.7–14.9)
GFR, ESTIMATED: >90 ML/MIN/1.73M2
GLUCOSE SERPL-MCNC: 88 MG/DL (ref 74–99)
GLUCOSE UR STRIP-MCNC: NEGATIVE MG/DL
HCT VFR BLD AUTO: 46 % (ref 37–47)
HGB BLD-MCNC: 14.7 G/DL (ref 12.5–16)
HGB UR QL STRIP.AUTO: NEGATIVE
IMM GRANULOCYTES # BLD AUTO: 0.03 K/UL
IMM GRANULOCYTES NFR BLD: 1 %
KETONES UR STRIP-MCNC: NEGATIVE MG/DL
LEUKOCYTE ESTERASE UR QL STRIP: NEGATIVE
LYMPHOCYTES NFR BLD: 1.57 K/UL
LYMPHOCYTES RELATIVE PERCENT: 28 % (ref 24–44)
MCH RBC QN AUTO: 28.4 PG (ref 27–31)
MCHC RBC AUTO-ENTMCNC: 32 G/DL (ref 32–36)
MCV RBC AUTO: 88.8 FL (ref 78–100)
MONOCYTES NFR BLD: 0.32 K/UL
MONOCYTES NFR BLD: 6 % (ref 0–5)
NEUTROPHILS NFR BLD: 62 % (ref 36–66)
NEUTS SEG NFR BLD: 3.51 K/UL
NITRITE UR QL STRIP: NEGATIVE
PH UR STRIP: 6.5 [PH] (ref 5–8)
PLATELET # BLD AUTO: 301 K/UL (ref 140–440)
PMV BLD AUTO: 10.6 FL (ref 7.5–11.1)
POTASSIUM SERPL-SCNC: 3.7 MMOL/L (ref 3.5–5.1)
PROT UR STRIP-MCNC: NEGATIVE MG/DL
RBC # BLD AUTO: 5.18 M/UL (ref 4.2–5.4)
SODIUM SERPL-SCNC: 138 MMOL/L (ref 136–145)
SP GR UR STRIP: <1.005 (ref 1–1.03)
UROBILINOGEN UR STRIP-ACNC: 0.2 EU/DL (ref 0–1)
WBC OTHER # BLD: 5.7 K/UL (ref 4–10.5)

## 2025-04-22 PROCEDURE — 85025 COMPLETE CBC W/AUTO DIFF WBC: CPT

## 2025-04-22 PROCEDURE — 81003 URINALYSIS AUTO W/O SCOPE: CPT

## 2025-04-22 PROCEDURE — 80048 BASIC METABOLIC PNL TOTAL CA: CPT

## 2025-04-22 PROCEDURE — 36415 COLL VENOUS BLD VENIPUNCTURE: CPT

## 2025-04-24 ENCOUNTER — ANESTHESIA EVENT (OUTPATIENT)
Dept: OPERATING ROOM | Age: 55
DRG: 443 | End: 2025-04-24
Payer: MEDICAID

## 2025-04-25 NOTE — ANESTHESIA PRE PROCEDURE
alert  FINDINGS:     Using the threshold of Tmax greater than 6 seconds, there is no significant volume of hypoperfused penumbra.     Using the threshold of CBF less than 30%, there is no significant volume to represent an ischemic core infarct.     The mismatch ratio is none.       Right Cervical Carotid:  No evidence of significant stenosis or dissection.     Left Cervical Carotid:  No evidence of significant stenosis or dissection.     Vertebrals:  Patent vessels with no evidence of significant stenosis. The vertebral arteries show co-dominance.     Intracranial Vasculature:  Fetal origin of the right posterior cerebral artery is seen. No evidence of aneurysm, vascular malformation, or major branch occlusion.

## 2025-04-25 NOTE — PROGRESS NOTES
4/25/25 -  for patient: surgery @ [facility] on  4/28/25 @ 1130, arrival 0930. NPO status and morning medications reviewed.  Please call with any questions.

## 2025-04-28 ENCOUNTER — ANESTHESIA (OUTPATIENT)
Dept: OPERATING ROOM | Age: 55
DRG: 443 | End: 2025-04-28
Payer: MEDICAID

## 2025-04-28 ENCOUNTER — HOSPITAL ENCOUNTER (INPATIENT)
Age: 55
LOS: 2 days | Discharge: HOME | DRG: 443 | End: 2025-04-30
Attending: SURGERY | Admitting: SPECIALIST
Payer: MEDICAID

## 2025-04-28 DIAGNOSIS — Z01.818 PRE-OP TESTING: ICD-10-CM

## 2025-04-28 DIAGNOSIS — G89.18 POST-OP PAIN: ICD-10-CM

## 2025-04-28 DIAGNOSIS — K40.90 UNILATERAL INGUINAL HERNIA WITHOUT OBSTRUCTION OR GANGRENE, RECURRENCE NOT SPECIFIED: ICD-10-CM

## 2025-04-28 DIAGNOSIS — N28.89 OTHER SPECIFIED DISORDERS OF KIDNEY AND URETER: ICD-10-CM

## 2025-04-28 LAB
ANION GAP SERPL CALCULATED.3IONS-SCNC: 17 MMOL/L (ref 9–17)
BUN SERPL-MCNC: 13 MG/DL (ref 7–20)
CALCIUM SERPL-MCNC: 8.7 MG/DL (ref 8.3–10.6)
CHLORIDE SERPL-SCNC: 101 MMOL/L (ref 99–110)
CO2 SERPL-SCNC: 22 MMOL/L (ref 21–32)
CREAT SERPL-MCNC: 0.9 MG/DL (ref 0.6–1.1)
GFR, ESTIMATED: 65 ML/MIN/1.73M2
GLUCOSE SERPL-MCNC: 146 MG/DL (ref 74–99)
HCT VFR BLD AUTO: 40.9 % (ref 37–47)
HGB BLD-MCNC: 13.3 G/DL (ref 12.5–16)
POTASSIUM SERPL-SCNC: 3.5 MMOL/L (ref 3.5–5.1)
SODIUM SERPL-SCNC: 139 MMOL/L (ref 136–145)

## 2025-04-28 PROCEDURE — 2580000003 HC RX 258: Performed by: SPECIALIST

## 2025-04-28 PROCEDURE — 3600000009 HC SURGERY ROBOT BASE: Performed by: SURGERY

## 2025-04-28 PROCEDURE — 3700000001 HC ADD 15 MINUTES (ANESTHESIA): Performed by: SURGERY

## 2025-04-28 PROCEDURE — 6360000002 HC RX W HCPCS: Performed by: SPECIALIST

## 2025-04-28 PROCEDURE — 6360000002 HC RX W HCPCS: Performed by: NURSE ANESTHETIST, CERTIFIED REGISTERED

## 2025-04-28 PROCEDURE — 2500000003 HC RX 250 WO HCPCS: Performed by: NURSE ANESTHETIST, CERTIFIED REGISTERED

## 2025-04-28 PROCEDURE — 6370000000 HC RX 637 (ALT 250 FOR IP): Performed by: ANESTHESIOLOGY

## 2025-04-28 PROCEDURE — 80048 BASIC METABOLIC PNL TOTAL CA: CPT

## 2025-04-28 PROCEDURE — 6370000000 HC RX 637 (ALT 250 FOR IP): Performed by: SPECIALIST

## 2025-04-28 PROCEDURE — 0WQF4ZZ REPAIR ABDOMINAL WALL, PERCUTANEOUS ENDOSCOPIC APPROACH: ICD-10-PCS | Performed by: SURGERY

## 2025-04-28 PROCEDURE — 0TB14ZZ EXCISION OF LEFT KIDNEY, PERCUTANEOUS ENDOSCOPIC APPROACH: ICD-10-PCS | Performed by: SURGERY

## 2025-04-28 PROCEDURE — 2709999900 HC NON-CHARGEABLE SUPPLY: Performed by: SURGERY

## 2025-04-28 PROCEDURE — 2720000010 HC SURG SUPPLY STERILE: Performed by: SURGERY

## 2025-04-28 PROCEDURE — 88307 TISSUE EXAM BY PATHOLOGIST: CPT

## 2025-04-28 PROCEDURE — C1889 IMPLANT/INSERT DEVICE, NOC: HCPCS | Performed by: SURGERY

## 2025-04-28 PROCEDURE — 6360000002 HC RX W HCPCS: Performed by: SURGERY

## 2025-04-28 PROCEDURE — 6360000002 HC RX W HCPCS: Performed by: ANESTHESIOLOGY

## 2025-04-28 PROCEDURE — 3700000000 HC ANESTHESIA ATTENDED CARE: Performed by: SURGERY

## 2025-04-28 PROCEDURE — 2500000003 HC RX 250 WO HCPCS: Performed by: SPECIALIST

## 2025-04-28 PROCEDURE — 7100000001 HC PACU RECOVERY - ADDTL 15 MIN: Performed by: SURGERY

## 2025-04-28 PROCEDURE — 1200000000 HC SEMI PRIVATE

## 2025-04-28 PROCEDURE — S2900 ROBOTIC SURGICAL SYSTEM: HCPCS | Performed by: SURGERY

## 2025-04-28 PROCEDURE — 85018 HEMOGLOBIN: CPT

## 2025-04-28 PROCEDURE — 8E0W4CZ ROBOTIC ASSISTED PROCEDURE OF TRUNK REGION, PERCUTANEOUS ENDOSCOPIC APPROACH: ICD-10-PCS | Performed by: SURGERY

## 2025-04-28 PROCEDURE — 85014 HEMATOCRIT: CPT

## 2025-04-28 PROCEDURE — 3600000019 HC SURGERY ROBOT ADDTL 15MIN: Performed by: SURGERY

## 2025-04-28 PROCEDURE — 2780000010 HC IMPLANT OTHER: Performed by: SURGERY

## 2025-04-28 PROCEDURE — 7100000000 HC PACU RECOVERY - FIRST 15 MIN: Performed by: SURGERY

## 2025-04-28 DEVICE — HEMOLOK L 6 CLIPS/CART
Type: IMPLANTABLE DEVICE | Site: ABDOMEN | Status: FUNCTIONAL
Brand: WECK

## 2025-04-28 RX ORDER — HYDRALAZINE HYDROCHLORIDE 20 MG/ML
10 INJECTION INTRAMUSCULAR; INTRAVENOUS
Status: DISCONTINUED | OUTPATIENT
Start: 2025-04-28 | End: 2025-04-28 | Stop reason: HOSPADM

## 2025-04-28 RX ORDER — ONDANSETRON 4 MG/1
4 TABLET, ORALLY DISINTEGRATING ORAL EVERY 8 HOURS PRN
Status: DISCONTINUED | OUTPATIENT
Start: 2025-04-28 | End: 2025-04-30 | Stop reason: HOSPADM

## 2025-04-28 RX ORDER — OXYCODONE HYDROCHLORIDE 5 MG/1
5 TABLET ORAL EVERY 4 HOURS PRN
Status: DISCONTINUED | OUTPATIENT
Start: 2025-04-28 | End: 2025-04-30 | Stop reason: HOSPADM

## 2025-04-28 RX ORDER — MORPHINE SULFATE 4 MG/ML
4 INJECTION, SOLUTION INTRAMUSCULAR; INTRAVENOUS
Status: DISCONTINUED | OUTPATIENT
Start: 2025-04-28 | End: 2025-04-30 | Stop reason: HOSPADM

## 2025-04-28 RX ORDER — ONDANSETRON 2 MG/ML
4 INJECTION INTRAMUSCULAR; INTRAVENOUS
Status: DISCONTINUED | OUTPATIENT
Start: 2025-04-28 | End: 2025-04-30 | Stop reason: HOSPADM

## 2025-04-28 RX ORDER — ONDANSETRON 2 MG/ML
INJECTION INTRAMUSCULAR; INTRAVENOUS
Status: DISCONTINUED | OUTPATIENT
Start: 2025-04-28 | End: 2025-04-28 | Stop reason: SDUPTHER

## 2025-04-28 RX ORDER — SODIUM CHLORIDE 0.9 % (FLUSH) 0.9 %
5-40 SYRINGE (ML) INJECTION EVERY 12 HOURS SCHEDULED
Status: DISCONTINUED | OUTPATIENT
Start: 2025-04-28 | End: 2025-04-30 | Stop reason: HOSPADM

## 2025-04-28 RX ORDER — BISACODYL 5 MG/1
5 TABLET, DELAYED RELEASE ORAL DAILY PRN
Status: DISCONTINUED | OUTPATIENT
Start: 2025-04-28 | End: 2025-04-30 | Stop reason: HOSPADM

## 2025-04-28 RX ORDER — ACETAMINOPHEN 325 MG/1
650 TABLET ORAL EVERY 6 HOURS
Status: DISCONTINUED | OUTPATIENT
Start: 2025-04-28 | End: 2025-04-30 | Stop reason: HOSPADM

## 2025-04-28 RX ORDER — DEXAMETHASONE SODIUM PHOSPHATE 4 MG/ML
INJECTION, SOLUTION INTRA-ARTICULAR; INTRALESIONAL; INTRAMUSCULAR; INTRAVENOUS; SOFT TISSUE
Status: DISCONTINUED | OUTPATIENT
Start: 2025-04-28 | End: 2025-04-28 | Stop reason: SDUPTHER

## 2025-04-28 RX ORDER — DEXTROSE MONOHYDRATE, SODIUM CHLORIDE, AND POTASSIUM CHLORIDE 50; 1.49; 4.5 G/1000ML; G/1000ML; G/1000ML
INJECTION, SOLUTION INTRAVENOUS CONTINUOUS
Status: DISCONTINUED | OUTPATIENT
Start: 2025-04-28 | End: 2025-04-30 | Stop reason: HOSPADM

## 2025-04-28 RX ORDER — FENTANYL CITRATE 50 UG/ML
INJECTION, SOLUTION INTRAMUSCULAR; INTRAVENOUS
Status: DISCONTINUED | OUTPATIENT
Start: 2025-04-28 | End: 2025-04-28 | Stop reason: SDUPTHER

## 2025-04-28 RX ORDER — LIDOCAINE HYDROCHLORIDE 20 MG/ML
INJECTION, SOLUTION INTRAVENOUS
Status: DISCONTINUED | OUTPATIENT
Start: 2025-04-28 | End: 2025-04-28 | Stop reason: SDUPTHER

## 2025-04-28 RX ORDER — SCOPOLAMINE 1 MG/3D
1 PATCH, EXTENDED RELEASE TRANSDERMAL ONCE
Status: DISCONTINUED | OUTPATIENT
Start: 2025-04-28 | End: 2025-04-30 | Stop reason: HOSPADM

## 2025-04-28 RX ORDER — SODIUM CHLORIDE 0.9 % (FLUSH) 0.9 %
5-40 SYRINGE (ML) INJECTION PRN
Status: DISCONTINUED | OUTPATIENT
Start: 2025-04-28 | End: 2025-04-28 | Stop reason: HOSPADM

## 2025-04-28 RX ORDER — SODIUM CHLORIDE 0.9 % (FLUSH) 0.9 %
5-40 SYRINGE (ML) INJECTION PRN
Status: DISCONTINUED | OUTPATIENT
Start: 2025-04-28 | End: 2025-04-30 | Stop reason: HOSPADM

## 2025-04-28 RX ORDER — ONDANSETRON 2 MG/ML
4 INJECTION INTRAMUSCULAR; INTRAVENOUS EVERY 6 HOURS PRN
Status: DISCONTINUED | OUTPATIENT
Start: 2025-04-28 | End: 2025-04-30 | Stop reason: HOSPADM

## 2025-04-28 RX ORDER — BUPIVACAINE HYDROCHLORIDE 5 MG/ML
INJECTION, SOLUTION PERINEURAL
Status: DISCONTINUED | OUTPATIENT
Start: 2025-04-28 | End: 2025-04-28 | Stop reason: ALTCHOICE

## 2025-04-28 RX ORDER — SODIUM CHLORIDE 0.9 % (FLUSH) 0.9 %
5-40 SYRINGE (ML) INJECTION EVERY 12 HOURS SCHEDULED
Status: DISCONTINUED | OUTPATIENT
Start: 2025-04-28 | End: 2025-04-28 | Stop reason: HOSPADM

## 2025-04-28 RX ORDER — PROPOFOL 10 MG/ML
INJECTION, EMULSION INTRAVENOUS
Status: DISCONTINUED | OUTPATIENT
Start: 2025-04-28 | End: 2025-04-28 | Stop reason: SDUPTHER

## 2025-04-28 RX ORDER — SODIUM CHLORIDE 9 MG/ML
INJECTION, SOLUTION INTRAVENOUS PRN
Status: DISCONTINUED | OUTPATIENT
Start: 2025-04-28 | End: 2025-04-30 | Stop reason: HOSPADM

## 2025-04-28 RX ORDER — MIDAZOLAM HYDROCHLORIDE 1 MG/ML
INJECTION, SOLUTION INTRAMUSCULAR; INTRAVENOUS
Status: DISCONTINUED | OUTPATIENT
Start: 2025-04-28 | End: 2025-04-28 | Stop reason: SDUPTHER

## 2025-04-28 RX ORDER — METHOCARBAMOL 100 MG/ML
1000 INJECTION, SOLUTION INTRAMUSCULAR; INTRAVENOUS
Status: COMPLETED | OUTPATIENT
Start: 2025-04-28 | End: 2025-04-29

## 2025-04-28 RX ORDER — SODIUM CHLORIDE 450 MG/100ML
INJECTION, SOLUTION INTRAVENOUS CONTINUOUS
Status: DISCONTINUED | OUTPATIENT
Start: 2025-04-28 | End: 2025-04-29

## 2025-04-28 RX ORDER — SODIUM CHLORIDE 9 MG/ML
INJECTION, SOLUTION INTRAVENOUS PRN
Status: DISCONTINUED | OUTPATIENT
Start: 2025-04-28 | End: 2025-04-28 | Stop reason: HOSPADM

## 2025-04-28 RX ORDER — FENTANYL CITRATE 50 UG/ML
25 INJECTION, SOLUTION INTRAMUSCULAR; INTRAVENOUS EVERY 5 MIN PRN
Status: DISCONTINUED | OUTPATIENT
Start: 2025-04-28 | End: 2025-04-28 | Stop reason: HOSPADM

## 2025-04-28 RX ORDER — NALOXONE HYDROCHLORIDE 0.4 MG/ML
INJECTION, SOLUTION INTRAMUSCULAR; INTRAVENOUS; SUBCUTANEOUS PRN
Status: DISCONTINUED | OUTPATIENT
Start: 2025-04-28 | End: 2025-04-28 | Stop reason: HOSPADM

## 2025-04-28 RX ORDER — PROMETHAZINE HYDROCHLORIDE 25 MG/ML
12.5 INJECTION, SOLUTION INTRAMUSCULAR; INTRAVENOUS EVERY 6 HOURS PRN
Status: DISCONTINUED | OUTPATIENT
Start: 2025-04-28 | End: 2025-04-30 | Stop reason: HOSPADM

## 2025-04-28 RX ORDER — OXYCODONE HYDROCHLORIDE 10 MG/1
10 TABLET ORAL EVERY 4 HOURS PRN
Status: DISCONTINUED | OUTPATIENT
Start: 2025-04-28 | End: 2025-04-30 | Stop reason: HOSPADM

## 2025-04-28 RX ORDER — ACETAMINOPHEN 325 MG/1
650 TABLET ORAL
Status: DISCONTINUED | OUTPATIENT
Start: 2025-04-28 | End: 2025-04-28 | Stop reason: HOSPADM

## 2025-04-28 RX ORDER — PROCHLORPERAZINE EDISYLATE 5 MG/ML
5 INJECTION INTRAMUSCULAR; INTRAVENOUS
Status: DISCONTINUED | OUTPATIENT
Start: 2025-04-28 | End: 2025-04-28 | Stop reason: HOSPADM

## 2025-04-28 RX ORDER — MORPHINE SULFATE 2 MG/ML
2 INJECTION, SOLUTION INTRAMUSCULAR; INTRAVENOUS
Status: DISCONTINUED | OUTPATIENT
Start: 2025-04-28 | End: 2025-04-30 | Stop reason: HOSPADM

## 2025-04-28 RX ORDER — CEFAZOLIN SODIUM 1 G/3ML
INJECTION, POWDER, FOR SOLUTION INTRAMUSCULAR; INTRAVENOUS
Status: DISCONTINUED | OUTPATIENT
Start: 2025-04-28 | End: 2025-04-28 | Stop reason: SDUPTHER

## 2025-04-28 RX ORDER — ROCURONIUM BROMIDE 10 MG/ML
INJECTION, SOLUTION INTRAVENOUS
Status: DISCONTINUED | OUTPATIENT
Start: 2025-04-28 | End: 2025-04-28 | Stop reason: SDUPTHER

## 2025-04-28 RX ORDER — SODIUM CHLORIDE, SODIUM LACTATE, POTASSIUM CHLORIDE, CALCIUM CHLORIDE 600; 310; 30; 20 MG/100ML; MG/100ML; MG/100ML; MG/100ML
INJECTION, SOLUTION INTRAVENOUS CONTINUOUS
Status: DISCONTINUED | OUTPATIENT
Start: 2025-04-28 | End: 2025-04-28 | Stop reason: HOSPADM

## 2025-04-28 RX ADMIN — DEXAMETHASONE SODIUM PHOSPHATE 8 MG: 4 INJECTION, SOLUTION INTRAMUSCULAR; INTRAVENOUS at 12:05

## 2025-04-28 RX ADMIN — ROCURONIUM BROMIDE 20 MG: 50 INJECTION INTRAVENOUS at 15:34

## 2025-04-28 RX ADMIN — HYDROMORPHONE HYDROCHLORIDE 0.5 MG: 1 INJECTION, SOLUTION INTRAMUSCULAR; INTRAVENOUS; SUBCUTANEOUS at 17:28

## 2025-04-28 RX ADMIN — ROCURONIUM BROMIDE 10 MG: 50 INJECTION INTRAVENOUS at 15:03

## 2025-04-28 RX ADMIN — SUGAMMADEX 200 MG: 100 INJECTION, SOLUTION INTRAVENOUS at 17:39

## 2025-04-28 RX ADMIN — HYDROMORPHONE HYDROCHLORIDE 0.5 MG: 1 INJECTION, SOLUTION INTRAMUSCULAR; INTRAVENOUS; SUBCUTANEOUS at 18:31

## 2025-04-28 RX ADMIN — CEFAZOLIN 2000 MG: 2 INJECTION, POWDER, FOR SOLUTION INTRAMUSCULAR; INTRAVENOUS at 12:00

## 2025-04-28 RX ADMIN — PROMETHAZINE HYDROCHLORIDE 12.5 MG: 25 INJECTION INTRAMUSCULAR; INTRAVENOUS at 20:53

## 2025-04-28 RX ADMIN — CEFAZOLIN 2 G: 1 INJECTION, POWDER, FOR SOLUTION INTRAMUSCULAR; INTRAVENOUS; PARENTERAL at 16:10

## 2025-04-28 RX ADMIN — SODIUM CHLORIDE: 4.5 INJECTION, SOLUTION INTRAVENOUS at 20:20

## 2025-04-28 RX ADMIN — FENTANYL CITRATE 50 MCG: 50 INJECTION, SOLUTION INTRAMUSCULAR; INTRAVENOUS at 11:57

## 2025-04-28 RX ADMIN — SODIUM CHLORIDE, POTASSIUM CHLORIDE, SODIUM LACTATE AND CALCIUM CHLORIDE: 600; 310; 30; 20 INJECTION, SOLUTION INTRAVENOUS at 10:24

## 2025-04-28 RX ADMIN — SODIUM CHLORIDE, PRESERVATIVE FREE 10 ML: 5 INJECTION INTRAVENOUS at 20:19

## 2025-04-28 RX ADMIN — FENTANYL CITRATE 50 MCG: 50 INJECTION, SOLUTION INTRAMUSCULAR; INTRAVENOUS at 12:42

## 2025-04-28 RX ADMIN — ROCURONIUM BROMIDE 10 MG: 50 INJECTION INTRAVENOUS at 12:45

## 2025-04-28 RX ADMIN — MORPHINE SULFATE 4 MG: 4 INJECTION INTRAVENOUS at 20:37

## 2025-04-28 RX ADMIN — SODIUM CHLORIDE, POTASSIUM CHLORIDE, SODIUM LACTATE AND CALCIUM CHLORIDE: 600; 310; 30; 20 INJECTION, SOLUTION INTRAVENOUS at 13:30

## 2025-04-28 RX ADMIN — MIDAZOLAM 2 MG: 1 INJECTION INTRAMUSCULAR; INTRAVENOUS at 11:49

## 2025-04-28 RX ADMIN — HYDROMORPHONE HYDROCHLORIDE 0.5 MG: 1 INJECTION, SOLUTION INTRAMUSCULAR; INTRAVENOUS; SUBCUTANEOUS at 16:31

## 2025-04-28 RX ADMIN — ROCURONIUM BROMIDE 20 MG: 50 INJECTION INTRAVENOUS at 14:13

## 2025-04-28 RX ADMIN — PROPOFOL 200 MG: 10 INJECTION, EMULSION INTRAVENOUS at 11:57

## 2025-04-28 RX ADMIN — ROCURONIUM BROMIDE 50 MG: 50 INJECTION INTRAVENOUS at 11:57

## 2025-04-28 RX ADMIN — ROCURONIUM BROMIDE 10 MG: 50 INJECTION INTRAVENOUS at 13:30

## 2025-04-28 RX ADMIN — HYDROMORPHONE HYDROCHLORIDE 0.5 MG: 1 INJECTION, SOLUTION INTRAMUSCULAR; INTRAVENOUS; SUBCUTANEOUS at 17:27

## 2025-04-28 RX ADMIN — POTASSIUM CHLORIDE, DEXTROSE MONOHYDRATE AND SODIUM CHLORIDE: 150; 5; 450 INJECTION, SOLUTION INTRAVENOUS at 18:16

## 2025-04-28 RX ADMIN — ROCURONIUM BROMIDE 10 MG: 50 INJECTION INTRAVENOUS at 16:31

## 2025-04-28 RX ADMIN — LIDOCAINE HYDROCHLORIDE 100 MG: 20 INJECTION, SOLUTION INTRAVENOUS at 11:57

## 2025-04-28 RX ADMIN — ONDANSETRON 4 MG: 2 INJECTION INTRAMUSCULAR; INTRAVENOUS at 17:03

## 2025-04-28 RX ADMIN — ACETAMINOPHEN 650 MG: 325 TABLET ORAL at 20:20

## 2025-04-28 ASSESSMENT — PAIN DESCRIPTION - DESCRIPTORS
DESCRIPTORS: ACHING
DESCRIPTORS: ACHING;DISCOMFORT;SORE
DESCRIPTORS: SORE
DESCRIPTORS: ACHING;SORE

## 2025-04-28 ASSESSMENT — PAIN DESCRIPTION - PAIN TYPE
TYPE: SURGICAL PAIN
TYPE: SURGICAL PAIN

## 2025-04-28 ASSESSMENT — PAIN DESCRIPTION - ORIENTATION
ORIENTATION: OTHER (COMMENT)
ORIENTATION: MID
ORIENTATION: OTHER (COMMENT)
ORIENTATION: MID

## 2025-04-28 ASSESSMENT — PAIN DESCRIPTION - LOCATION
LOCATION: ABDOMEN

## 2025-04-28 ASSESSMENT — PAIN SCALES - GENERAL
PAINLEVEL_OUTOF10: 5
PAINLEVEL_OUTOF10: 3
PAINLEVEL_OUTOF10: 7
PAINLEVEL_OUTOF10: 7

## 2025-04-28 ASSESSMENT — PAIN DESCRIPTION - ONSET
ONSET: ON-GOING
ONSET: ON-GOING

## 2025-04-28 ASSESSMENT — PAIN DESCRIPTION - FREQUENCY
FREQUENCY: CONTINUOUS
FREQUENCY: CONTINUOUS

## 2025-04-28 ASSESSMENT — PAIN - FUNCTIONAL ASSESSMENT
PAIN_FUNCTIONAL_ASSESSMENT: PREVENTS OR INTERFERES SOME ACTIVE ACTIVITIES AND ADLS
PAIN_FUNCTIONAL_ASSESSMENT: PREVENTS OR INTERFERES SOME ACTIVE ACTIVITIES AND ADLS
PAIN_FUNCTIONAL_ASSESSMENT: 0-10
PAIN_FUNCTIONAL_ASSESSMENT: ACTIVITIES ARE NOT PREVENTED

## 2025-04-28 NOTE — ANESTHESIA PROCEDURE NOTES
Arterial Line:    An arterial line was placed using surface landmarks, in the OR for the following indication(s): continuous blood pressure monitoring and blood sampling needed.    A  (size) (length), Arrow (type) catheter was placed, into the right radial artery, secured by tape and Tegaderm.  Anesthesia type: General    Events:  patient tolerated procedure well with no complications.    Additional notes:  Pt under general anesthesia for a line placement. No complications. 4/28/2025 12:42 PM4/28/2025 12:42 PM  Resident/CRNA: Liliana Randhawa APRN - CRNA  Performed: Resident/CRNA   Preanesthetic Checklist  Completed: patient identified, equipment checked, pre-op evaluation, timeout performed, monitors applied/VS acknowledged and fire risk safety assessment completed and verbalized

## 2025-04-28 NOTE — OP NOTE
Operative Note      Patient: Demi Leal  YOB: 1970  MRN: [id number]    Date of Procedure: 4/28/2025    Pre-Op Diagnosis Codes:      * Unilateral inguinal hernia without obstruction or gangrene, recurrence not specified [K40.90]     * Other specified disorders of kidney and ureter [N28.89]    Post-Op Diagnosis: Same       Procedure(s):  HERNIA INCISIONAL/VENTRAL REPAIR LAPAROSCOPIC ROBOTIC XI  LEFT NEPHRECTOMY PARTIAL ROBOTIC XI    Surgeon(s):  Arden Mccarty MD Annamraju, Ananth, MD    Assistant:   First Assistant: Mayra Arreguin RN    Anesthesia: General    Estimated Blood Loss (mL): less than 100     Complications: None    Specimens:   * No specimens in log *    Implants:  * No implants in log *      Drains: * No LDAs found *    Findings:  Infection Present At Time Of Surgery (PATOS) (choose all levels that have infection present):  No infection present  Other Findings: Clamp time: 24 minutes    Detailed Description of Procedure:   Demi is a 54 yo female with a 3.8 cm left renal mass. Management options were reviewed and staging studies were negative for extrarenal disease. Given her options, she selected a left robotic partial nephrectomy with possible radical nephrectomy. Risks and complications were reviewed with the patient including bleeding, infection, vascular injury, bowel injury, anesthesia related complications, positioning related complications and she is agreeable to the procedure.    Details of Procedure:   After induction with general anesthesia, a wei catheter was placed into her urethra and the bladder was drained. Demi was placed in a right lateral decubitus position with her left flank up. Axillary roll was placed under there axilla to prevent nerve compression. The table was flexed such that the pelvis was at the break of the table. She was secured with bean bag and tape. The patient's left arm was brought over her right arm and secured. All dependent areas  were carefully padded. Once properly secured, she was prepped and draped according to sterile technique. Time out was taken. All plans were agreed upon. IV antibiotics were given.     A 5 mm incision was made in the left upper quadrant and a visual obturator port was inserted into the abdomen without difficulty. The abdomen was fully insufflated at 15 cm H20. One robotic port was placed superiorly and two ports inferiorly. The 12 air seal assist port was placed above the umbilicus in the midline. The 5 mm port was up sized to an 8 mm port. Once complete, the robot was docked without difficulty.    Instruments used were a scissors, fenestrated bipolar, and a prograsp. The colon was reflected by incising along the line of Toldt. The Gerota's fascia was identified and the kidney was freed inferior to superiorly. On the left side, the splenorenal ligaments were released to allow for the spleen to rotate medially and the dissection was continued until the adrenal gland was visible. The gonadal vein was then identified at the lower pole and dissected of its medial attachments. The ureter was identified and lifted until the psoas muscle was visualized.  This allowed access to the posterior plane of the kidney and dissection was carried superiorly until the vessels were noted. The artery was dissected free.  The vein was dissected both superiorly and inferiorly. The space between the adrenal gland and renal vein was dissected such that the adrenal gland could be completely freed from the upper pole of the kidney.     The ultrasound probe was then inserted to identify the mass. The lateral attachments of the kidney were released to allow the kidney to rotate medially and identify the posterior upper pole mass. 0.2ml of ICG was given for differential fluorescence. The edge of the mass was marked. Two 3-0 V lock were inserted into the abdomen.   The artery and vein were clamped due to the size and depth of the tumor as it

## 2025-04-28 NOTE — BRIEF OP NOTE
Brief Postoperative Note      Patient: Demi Leal  YOB: 1970  MRN: [id number]    Date of Procedure: 4/28/2025    Pre-Op Diagnosis Codes:      * Unilateral inguinal hernia without obstruction or gangrene, recurrence not specified [K40.90]     * Other specified disorders of kidney and ureter [N28.89]    Post-Op Diagnosis:  Ventral hernia without obstruction or gangrene       Procedure(s):  OPEN HERNIA INCISIONAL/VENTRAL REPAIR    Surgeon(s):  Vipin Azar MD Andom, Tedros, MD    Assistant:  First Assistant: Mayra Arreguin RN    Anesthesia: General    Estimated Blood Loss (mL): Minimal    Complications: None    Specimens:   ID Type Source Tests Collected by Time Destination   A : LEFT RENAL MASS Tissue Tissue SURGICAL PATHOLOGY Vipin Azar MD 4/28/2025 1701        Implants:  Implant Name Type Inv. Item Serial No.  Lot No. LRB No. Used Action   CLIP INT L POLYMER KATINA LIG HEM O KATINA (6EA/PK) - [id number]  CLIP INT L POLYMER KATINA LIG HEM O KATINA (6EA/PK)  [employer]- [id number] N/A 2 Implanted         Drains:   Closed/Suction Drain Medial LLQ Bulb (Active)       Findings:  Infection Present At Time Of Surgery (PATOS) (choose all levels that have infection present):  No infection present  Other Findings: >1cm ventral fat containing hernia at the midline just superior to the umbilicus. Primary closure.     Electronically signed by Franci Washington DO on 4/28/2025 at 5:35 PM  General surgery,

## 2025-04-28 NOTE — ANESTHESIA POSTPROCEDURE EVALUATION
Department of Anesthesiology  Postprocedure Note    Patient: Demi Leal  MRN: [id number]  YOB: 1970  Date of evaluation: 4/28/2025    Procedure Summary       Date: 04/28/25 Room / Location: [address]    Anesthesia Start: 1147 Anesthesia Stop: 1750    Procedures:       HERNIA INCISIONAL/VENTRAL REPAIR LAPAROSCOPIC ROBOTIC XI (Abdomen)      LEFT NEPHRECTOMY PARTIAL ROBOTIC XI (Left: Kidney) Diagnosis:       Unilateral inguinal hernia without obstruction or gangrene, recurrence not specified      Other specified disorders of kidney and ureter      (Unilateral inguinal hernia without obstruction or gangrene, recurrence not specified [K40.90])      (Other specified disorders of kidney and ureter [N28.89])    Surgeons: Arden Mccarty MD; Vipin Azar MD Responsible Provider: Danny Roberson MD    Anesthesia Type: general ASA Status: 2            Anesthesia Type: No value filed.    Rito Phase I: Rito Score: 10    Rito Phase II:      Anesthesia Post Evaluation    Patient location during evaluation: PACU  Patient participation: complete - patient participated  Level of consciousness: awake  Airway patency: patent  Nausea & Vomiting: no nausea  Cardiovascular status: blood pressure returned to baseline  Respiratory status: acceptable  Hydration status: euvolemic  Multimodal analgesia pain management approach  Pain management: adequate    No notable events documented.

## 2025-04-28 NOTE — PROGRESS NOTES
4 Eyes Skin Assessment     NAME:  Demi Leal  YOB: 1970  MEDICAL RECORD NUMBER:  [id number]    The patient is being assessed for  Admission    I agree that at least one RN has performed a thorough Head to Toe Skin Assessment on the patient. ALL assessment sites listed below have been assessed.      Areas assessed by both nurses:    Head, Face, Ears, Shoulders, Back, Chest, Arms, Elbows, Hands, Sacrum. Buttock, Coccyx, Ischium, Legs. Feet and Heels, and Under Medical Devices         Does the Patient have a Wound? No noted wound(s)     Surgical sites  Vivek Prevention initiated by RN: No  Wound Care Orders initiated by RN: No    Pressure Injury (Stage 3,4, Unstageable, DTI, NWPT, and Complex wounds) if present, place Wound referral order by RN under : No    New Ostomies, if present place, Ostomy referral order under : No     Nurse 1 eSignature: Electronically signed by DONA JOHNSON RN on 4/28/25 at 7:27 PM EDT    **SHARE this note so that the co-signing nurse can place an eSignature**    Nurse 2 eSignature: Electronically signed by Luz Chambers RN on 4/29/25 at 7:56 AM EDT

## 2025-04-28 NOTE — PROGRESS NOTES
1746 Received to PACU, placed on cardiac monitor, alarms on.  Patient lightly sedated but does arouse to voice/touch.  Respirations unlabored.  Patient with 4 ABD lap sites closed with sutures and glue; L lower lap site transitioned to HE drain.  Two midline ABD incisions closed with sutures and glue.  All sites well approximated with no drainage.  ABD soft.  Minor in place draining clear yellow urine.  1750 Warm blankets placed on patient and ABD for comfort.  1755 H/H and BMP obtained from right radial juan francisco and sent to lab.  1800 R radial juan francisco discontinued.  Tip intact.  Pressure held x5 min.  Bleeding controlled.  Pressure dressing applied.  1802 Changing R hand IV dressing due to coming off.  R radial juan francisco dressing fell off.  Small hematoma noted to R radial juan francisco location.  Bleeding remains controlled.  New pressure dressing applied.    1819 Report called to BAMBI Mendoza, 1N.  Transport order placed.  1832 Medicated with 0.5mg Dilaudid for pain and 4mg Zofran for nausea per MAR.  1839 Dr. STOLL notified of lab results of H/H and BMP as well as HE drainage while in PACU.  No new orders received.  1845 Patient leaving PACU with unchanged surgical sites and improved pain.

## 2025-04-29 LAB
ANION GAP SERPL CALCULATED.3IONS-SCNC: 13 MMOL/L (ref 9–17)
BUN SERPL-MCNC: 12 MG/DL (ref 7–20)
CALCIUM SERPL-MCNC: 8.6 MG/DL (ref 8.3–10.6)
CHLORIDE SERPL-SCNC: 102 MMOL/L (ref 99–110)
CO2 SERPL-SCNC: 24 MMOL/L (ref 21–32)
CREAT SERPL-MCNC: 0.9 MG/DL (ref 0.6–1.1)
CREATININE FLUID: 0.9 MG/DL
ERYTHROCYTE [DISTWIDTH] IN BLOOD BY AUTOMATED COUNT: 13.3 % (ref 11.7–14.9)
GFR, ESTIMATED: 62 ML/MIN/1.73M2
GLUCOSE BLD-MCNC: 168 MG/DL (ref 74–99)
GLUCOSE SERPL-MCNC: 107 MG/DL (ref 74–99)
HCT VFR BLD AUTO: 40.1 % (ref 37–47)
HGB BLD-MCNC: 13 G/DL (ref 12.5–16)
MCH RBC QN AUTO: 28.8 PG (ref 27–31)
MCHC RBC AUTO-ENTMCNC: 32.4 G/DL (ref 32–36)
MCV RBC AUTO: 88.7 FL (ref 78–100)
PLATELET # BLD AUTO: 249 K/UL (ref 140–440)
PMV BLD AUTO: 10.7 FL (ref 7.5–11.1)
POTASSIUM SERPL-SCNC: 3.4 MMOL/L (ref 3.5–5.1)
RBC # BLD AUTO: 4.52 M/UL (ref 4.2–5.4)
SODIUM SERPL-SCNC: 140 MMOL/L (ref 136–145)
SPECIMEN TYPE: NORMAL
WBC OTHER # BLD: 11.2 K/UL (ref 4–10.5)

## 2025-04-29 PROCEDURE — 82962 GLUCOSE BLOOD TEST: CPT

## 2025-04-29 PROCEDURE — APPNB30 APP NON BILLABLE TIME 0-30 MINS: Performed by: PHYSICIAN ASSISTANT

## 2025-04-29 PROCEDURE — 1200000000 HC SEMI PRIVATE

## 2025-04-29 PROCEDURE — 82570 ASSAY OF URINE CREATININE: CPT

## 2025-04-29 PROCEDURE — 6370000000 HC RX 637 (ALT 250 FOR IP): Performed by: PHYSICIAN ASSISTANT

## 2025-04-29 PROCEDURE — 6360000002 HC RX W HCPCS: Performed by: SURGERY

## 2025-04-29 PROCEDURE — 2500000003 HC RX 250 WO HCPCS: Performed by: SPECIALIST

## 2025-04-29 PROCEDURE — 6370000000 HC RX 637 (ALT 250 FOR IP): Performed by: SPECIALIST

## 2025-04-29 PROCEDURE — 6360000002 HC RX W HCPCS: Performed by: ANESTHESIOLOGY

## 2025-04-29 PROCEDURE — 6360000002 HC RX W HCPCS: Performed by: SPECIALIST

## 2025-04-29 PROCEDURE — 94761 N-INVAS EAR/PLS OXIMETRY MLT: CPT

## 2025-04-29 PROCEDURE — 85027 COMPLETE CBC AUTOMATED: CPT

## 2025-04-29 PROCEDURE — 80048 BASIC METABOLIC PNL TOTAL CA: CPT

## 2025-04-29 PROCEDURE — 99024 POSTOP FOLLOW-UP VISIT: CPT | Performed by: PHYSICIAN ASSISTANT

## 2025-04-29 RX ORDER — PANTOPRAZOLE SODIUM 40 MG/1
40 TABLET, DELAYED RELEASE ORAL
Status: DISCONTINUED | OUTPATIENT
Start: 2025-04-29 | End: 2025-04-30 | Stop reason: HOSPADM

## 2025-04-29 RX ADMIN — MORPHINE SULFATE 4 MG: 4 INJECTION INTRAVENOUS at 00:39

## 2025-04-29 RX ADMIN — ONDANSETRON 4 MG: 4 TABLET, ORALLY DISINTEGRATING ORAL at 14:16

## 2025-04-29 RX ADMIN — ACETAMINOPHEN 650 MG: 325 TABLET ORAL at 18:24

## 2025-04-29 RX ADMIN — PROMETHAZINE HYDROCHLORIDE 12.5 MG: 25 INJECTION INTRAMUSCULAR; INTRAVENOUS at 09:09

## 2025-04-29 RX ADMIN — ACETAMINOPHEN 650 MG: 325 TABLET ORAL at 14:15

## 2025-04-29 RX ADMIN — SODIUM CHLORIDE, PRESERVATIVE FREE 10 ML: 5 INJECTION INTRAVENOUS at 09:10

## 2025-04-29 RX ADMIN — MORPHINE SULFATE 4 MG: 4 INJECTION INTRAVENOUS at 10:25

## 2025-04-29 RX ADMIN — OXYCODONE HYDROCHLORIDE 10 MG: 10 TABLET ORAL at 22:42

## 2025-04-29 RX ADMIN — OXYCODONE HYDROCHLORIDE 10 MG: 10 TABLET ORAL at 05:34

## 2025-04-29 RX ADMIN — OXYCODONE HYDROCHLORIDE 10 MG: 10 TABLET ORAL at 14:15

## 2025-04-29 RX ADMIN — METHOCARBAMOL 1000 MG: 100 INJECTION INTRAMUSCULAR; INTRAVENOUS at 05:54

## 2025-04-29 RX ADMIN — SODIUM CHLORIDE, PRESERVATIVE FREE 10 ML: 5 INJECTION INTRAVENOUS at 20:30

## 2025-04-29 RX ADMIN — POTASSIUM CHLORIDE, DEXTROSE MONOHYDRATE AND SODIUM CHLORIDE: 150; 5; 450 INJECTION, SOLUTION INTRAVENOUS at 18:38

## 2025-04-29 RX ADMIN — PANTOPRAZOLE SODIUM 40 MG: 40 TABLET, DELAYED RELEASE ORAL at 09:09

## 2025-04-29 RX ADMIN — ONDANSETRON 4 MG: 2 INJECTION INTRAMUSCULAR; INTRAVENOUS at 00:38

## 2025-04-29 RX ADMIN — OXYCODONE HYDROCHLORIDE 10 MG: 10 TABLET ORAL at 18:24

## 2025-04-29 ASSESSMENT — PAIN DESCRIPTION - LOCATION
LOCATION: ABDOMEN
LOCATION: ABDOMEN;FLANK
LOCATION: ABDOMEN
LOCATION: ABDOMEN;FLANK
LOCATION: ABDOMEN;FLANK
LOCATION: ABDOMEN

## 2025-04-29 ASSESSMENT — PAIN DESCRIPTION - DESCRIPTORS
DESCRIPTORS: BURNING
DESCRIPTORS: ACHING
DESCRIPTORS: ACHING;SORE
DESCRIPTORS: ACHING;SORE
DESCRIPTORS: ACHING;SORE;DISCOMFORT
DESCRIPTORS: ACHING

## 2025-04-29 ASSESSMENT — PAIN DESCRIPTION - ORIENTATION
ORIENTATION: RIGHT;LEFT
ORIENTATION: ANTERIOR;MID
ORIENTATION: MID;UPPER;LOWER

## 2025-04-29 ASSESSMENT — ENCOUNTER SYMPTOMS
ABDOMINAL PAIN: 1
SORE THROAT: 0
EYE REDNESS: 0
NAUSEA: 1
BACK PAIN: 0
COLOR CHANGE: 0
EYE ITCHING: 0
PHOTOPHOBIA: 0
CHOKING: 0
RECTAL PAIN: 0
STRIDOR: 0
VOMITING: 1
ANAL BLEEDING: 0
CONSTIPATION: 0
APNEA: 0

## 2025-04-29 ASSESSMENT — PAIN SCALES - GENERAL
PAINLEVEL_OUTOF10: 7
PAINLEVEL_OUTOF10: 7
PAINLEVEL_OUTOF10: 4
PAINLEVEL_OUTOF10: 4
PAINLEVEL_OUTOF10: 7
PAINLEVEL_OUTOF10: 4
PAINLEVEL_OUTOF10: 7

## 2025-04-29 ASSESSMENT — PAIN DESCRIPTION - FREQUENCY
FREQUENCY: CONTINUOUS
FREQUENCY: CONTINUOUS

## 2025-04-29 ASSESSMENT — PAIN DESCRIPTION - PAIN TYPE
TYPE: SURGICAL PAIN
TYPE: SURGICAL PAIN

## 2025-04-29 ASSESSMENT — PAIN DESCRIPTION - ONSET
ONSET: ON-GOING
ONSET: ON-GOING

## 2025-04-29 ASSESSMENT — PAIN - FUNCTIONAL ASSESSMENT
PAIN_FUNCTIONAL_ASSESSMENT: ACTIVITIES ARE NOT PREVENTED
PAIN_FUNCTIONAL_ASSESSMENT: PREVENTS OR INTERFERES SOME ACTIVE ACTIVITIES AND ADLS
PAIN_FUNCTIONAL_ASSESSMENT: ACTIVITIES ARE NOT PREVENTED
PAIN_FUNCTIONAL_ASSESSMENT: PREVENTS OR INTERFERES SOME ACTIVE ACTIVITIES AND ADLS

## 2025-04-29 NOTE — CARE COORDINATION
Chart review. From home. Independent of her ADLs. Has insurance and PCP. Here for scheduled hernia repair. On bed rest currently. No CM needs anticipated at this time. CM available should needs arise.   Discharge plan anticipated home no needs. CM available.   04/29/25 0887   Service Assessment   Patient Orientation Alert and Oriented   Cognition Alert   History Provided By Medical Record   Primary Caregiver Self   Support Systems Children;Family Members   Patient's Healthcare Decision Maker is: Legal Next of Kin   PCP Verified by CM Yes   Prior Functional Level Independent in ADLs/IADLs   Current Functional Level Assistance with the following:;Other (see comment)  (Bed rest; hospital policy)   Can patient return to prior living arrangement Yes   Ability to make needs known: Good   Family able to assist with home care needs: Yes   Would you like for me to discuss the discharge plan with any other family members/significant others, and if so, who? No  (LNOK if needed)   Financial Resources Medicaid   Community Resources None   CM/SW Referral Other (see comment)  (Discharge planning)

## 2025-04-29 NOTE — PROGRESS NOTES
GENERAL SURGERY PROGRESS NOTE    Demi Leal is a 55 y.o. female with 1 Day Post-Op Robotic nephrectomy per Dr. STOLL and open ventral hernia repair.                 Subjective:    Pain: \"Pretty high\"  BM: -ve. Denies flatus   Diet: ADULT ORAL NUTRITION SUPPLEMENT; Breakfast; Standard High Calorie/High Protein Oral Supplement  ADULT DIET; Regular  Activity: wants to ambulate today    Pt with c/o vomiting and abd pain overnight and this AM. Denies fever/chills. Minor in place.     Review of Systems   Constitutional:  Positive for fever. Negative for chills.   HENT:  Negative for ear pain, mouth sores, sore throat and tinnitus.    Eyes:  Negative for photophobia, redness and itching.   Respiratory:  Negative for apnea, choking and stridor.    Cardiovascular:  Negative for chest pain and palpitations.   Gastrointestinal:  Positive for abdominal pain, nausea and vomiting. Negative for anal bleeding, constipation and rectal pain.   Endocrine: Negative for polydipsia.   Genitourinary:  Negative for enuresis, flank pain and hematuria.   Musculoskeletal:  Negative for back pain, joint swelling and myalgias.   Skin:  Negative for color change and pallor.   Allergic/Immunologic: Negative for environmental allergies.   Neurological:  Negative for syncope and speech difficulty.   Psychiatric/Behavioral:  Negative for confusion and hallucinations.        Objective:    Vitals: VITALS:  /71   Pulse 86   Temp 98.7 °F (37.1 °C) (Oral)   Resp 22   Ht 1.689 m (5' 6.5\")   Wt 54.8 kg (120 lb 13 oz)   SpO2 94%   BMI 19.21 kg/m²   TEMPERATURE:  Current - Temp: 98.7 °F (37.1 °C); Max - Temp  Av.7 °F (36.5 °C)  Min: 96.8 °F (36 °C)  Max: 98.7 °F (37.1 °C)    I/O:  0701 -  0700  In: 1000 [I.V.:1000]  Out: 160 [Drains:160]    Labs/Imaging Results:   Lab Results   Component Value Date    WBC 5.7 2025    HGB 13.3 2025    HCT 40.9 2025    MCV 88.8 2025     2025     Lab Results  for post-op visit.       Jackie Valencia PA-C

## 2025-04-29 NOTE — PLAN OF CARE
Problem: Discharge Planning  Goal: Discharge to home or other facility with appropriate resources  Outcome: Progressing     Problem: Pain  Goal: Verbalizes/displays adequate comfort level or baseline comfort level  Outcome: Progressing     Problem: Chronic Conditions and Co-morbidities  Goal: Patient's chronic conditions and co-morbidity symptoms are monitored and maintained or improved  Outcome: Progressing  Flowsheets (Taken 4/29/2025 0200 by Tico Portillo RN)  Care Plan - Patient's Chronic Conditions and Co-Morbidity Symptoms are Monitored and Maintained or Improved:   Monitor and assess patient's chronic conditions and comorbid symptoms for stability, deterioration, or improvement   Update acute care plan with appropriate goals if chronic or comorbid symptoms are exacerbated and prevent overall improvement and discharge     Problem: Safety - Adult  Goal: Free from fall injury  Outcome: Progressing     Problem: ABCDS Injury Assessment  Goal: Absence of physical injury  Outcome: Progressing

## 2025-04-29 NOTE — PROGRESS NOTES
additional spot compression imaging, compatible with overlapping fibroglandular tissue. There is a persistent 1.1 cm oval circumscribed high density mass in the upper outer right breast at anterior/middle depth. Bilateral breast ultrasound: In the right breast at 10-11:00 4 cm from the nipple there is a cyst measuring 1.3 x 1.0 x 0.9 cm, corresponding to the mammographic finding. In the left breast at 2:00 5 cm from the nipple there is an oval circumscribed hypoechoic mass measuring 0.4 x 0.4 x 0.2 cm. This is probably benign. Normal morphology lymph nodes are seen in the axillary regions bilaterally. IMPRESSION: No mammographic or sonographic evidence of malignancy in the right breast. There is a probably benign left breast mass located at 2:00 5 cm from the nipple, for which a follow-up left breast ultrasound is recommended in 6 months to confirm stability. ASSESSMENT: BI-RADS 3 - PROBABLY BENIGN RECOMMENDATIONS: Left Follow up diagnostic breast ultrasound in 6 months A result letter will be sent to the patient.  Dictated and Electronically Signed By: Kelly Calles MD 4/9/2025 12:29        LASHELL CAROLYN DIGITAL SCREEN BILATERAL  Result Date: 3/31/2025  LASHELL CAROLYN DIGITAL SCREEN BILATERAL DATE OF SERVICE:  3/31/2025 15:39 INDICATION FOR EXAMINATION: 54 years-old Female  Encounter for screening mammogram for malignant neoplasm of breast Lifetime score for Miguel-Gage: 6.05% COMPARISON: Single prior study available for review dated 8/28/2020.   TECHNIQUE: Bilateral 2-D and 3-D tomosynthesis images of the breast were obtained in the CC and MLO projections. Computer aided detection (CAD) was used to assist in the interpretation of the 2D mammogram. BREAST COMPOSITION: There are scattered areas of fibroglandular density. FINDINGS: There is a new somewhat high density mass measuring up to 14 mm in the middle third upper outer right breast for which targeted ultrasound is recommended. Asymmetry with question distortion on MLO

## 2025-04-30 VITALS
HEART RATE: 90 BPM | RESPIRATION RATE: 16 BRPM | DIASTOLIC BLOOD PRESSURE: 67 MMHG | OXYGEN SATURATION: 96 % | BODY MASS INDEX: 18.96 KG/M2 | WEIGHT: 120.81 LBS | TEMPERATURE: 98.4 F | HEIGHT: 67 IN | SYSTOLIC BLOOD PRESSURE: 103 MMHG

## 2025-04-30 LAB
ANION GAP SERPL CALCULATED.3IONS-SCNC: 9 MMOL/L (ref 9–17)
BUN SERPL-MCNC: 8 MG/DL (ref 7–20)
CALCIUM SERPL-MCNC: 8.3 MG/DL (ref 8.3–10.6)
CHLORIDE SERPL-SCNC: 101 MMOL/L (ref 99–110)
CO2 SERPL-SCNC: 25 MMOL/L (ref 21–32)
CREAT SERPL-MCNC: 0.9 MG/DL (ref 0.6–1.1)
ERYTHROCYTE [DISTWIDTH] IN BLOOD BY AUTOMATED COUNT: 13.7 % (ref 11.7–14.9)
GFR, ESTIMATED: 65 ML/MIN/1.73M2
GLUCOSE SERPL-MCNC: 114 MG/DL (ref 74–99)
HCT VFR BLD AUTO: 34.9 % (ref 37–47)
HGB BLD-MCNC: 11.4 G/DL (ref 12.5–16)
MCH RBC QN AUTO: 29.2 PG (ref 27–31)
MCHC RBC AUTO-ENTMCNC: 32.7 G/DL (ref 32–36)
MCV RBC AUTO: 89.3 FL (ref 78–100)
PLATELET # BLD AUTO: 208 K/UL (ref 140–440)
PMV BLD AUTO: 10.2 FL (ref 7.5–11.1)
POTASSIUM SERPL-SCNC: 3.3 MMOL/L (ref 3.5–5.1)
RBC # BLD AUTO: 3.91 M/UL (ref 4.2–5.4)
SODIUM SERPL-SCNC: 135 MMOL/L (ref 136–145)
WBC OTHER # BLD: 10.9 K/UL (ref 4–10.5)

## 2025-04-30 PROCEDURE — 49593 RPR AA HRN 1ST 3-10 RDC: CPT | Performed by: SURGERY

## 2025-04-30 PROCEDURE — 85027 COMPLETE CBC AUTOMATED: CPT

## 2025-04-30 PROCEDURE — 6370000000 HC RX 637 (ALT 250 FOR IP): Performed by: SPECIALIST

## 2025-04-30 PROCEDURE — 2500000003 HC RX 250 WO HCPCS: Performed by: SPECIALIST

## 2025-04-30 PROCEDURE — 80048 BASIC METABOLIC PNL TOTAL CA: CPT

## 2025-04-30 PROCEDURE — 94150 VITAL CAPACITY TEST: CPT

## 2025-04-30 PROCEDURE — 6370000000 HC RX 637 (ALT 250 FOR IP): Performed by: PHYSICIAN ASSISTANT

## 2025-04-30 PROCEDURE — 36415 COLL VENOUS BLD VENIPUNCTURE: CPT

## 2025-04-30 RX ORDER — OXYCODONE HYDROCHLORIDE AND ACETAMINOPHEN 5; 325 MG/1; MG/1
1 TABLET ORAL EVERY 6 HOURS PRN
Qty: 8 TABLET | Refills: 0 | Status: SHIPPED | OUTPATIENT
Start: 2025-04-30 | End: 2025-05-03

## 2025-04-30 RX ORDER — BISACODYL 5 MG/1
5 TABLET, DELAYED RELEASE ORAL DAILY PRN
Qty: 15 TABLET | Refills: 0 | Status: SHIPPED | OUTPATIENT
Start: 2025-04-30

## 2025-04-30 RX ADMIN — BISACODYL 5 MG: 5 TABLET, COATED ORAL at 08:22

## 2025-04-30 RX ADMIN — POTASSIUM CHLORIDE, DEXTROSE MONOHYDRATE AND SODIUM CHLORIDE: 150; 5; 450 INJECTION, SOLUTION INTRAVENOUS at 06:14

## 2025-04-30 RX ADMIN — OXYCODONE HYDROCHLORIDE 10 MG: 10 TABLET ORAL at 12:55

## 2025-04-30 RX ADMIN — ACETAMINOPHEN 650 MG: 325 TABLET ORAL at 01:35

## 2025-04-30 RX ADMIN — SODIUM CHLORIDE, PRESERVATIVE FREE 10 ML: 5 INJECTION INTRAVENOUS at 08:15

## 2025-04-30 RX ADMIN — PANTOPRAZOLE SODIUM 40 MG: 40 TABLET, DELAYED RELEASE ORAL at 06:41

## 2025-04-30 RX ADMIN — ACETAMINOPHEN 650 MG: 325 TABLET ORAL at 06:41

## 2025-04-30 RX ADMIN — ACETAMINOPHEN 650 MG: 325 TABLET ORAL at 12:55

## 2025-04-30 RX ADMIN — ONDANSETRON 4 MG: 4 TABLET, ORALLY DISINTEGRATING ORAL at 00:30

## 2025-04-30 RX ADMIN — OXYCODONE HYDROCHLORIDE 10 MG: 10 TABLET ORAL at 05:05

## 2025-04-30 ASSESSMENT — PAIN DESCRIPTION - FREQUENCY
FREQUENCY: CONTINUOUS

## 2025-04-30 ASSESSMENT — PAIN DESCRIPTION - ORIENTATION
ORIENTATION: ANTERIOR
ORIENTATION: ANTERIOR;MID
ORIENTATION: ANTERIOR;MID

## 2025-04-30 ASSESSMENT — PAIN DESCRIPTION - PAIN TYPE
TYPE: SURGICAL PAIN
TYPE: SURGICAL PAIN
TYPE: SURGICAL PAIN;ACUTE PAIN

## 2025-04-30 ASSESSMENT — PAIN DESCRIPTION - ONSET
ONSET: ON-GOING

## 2025-04-30 ASSESSMENT — PAIN - FUNCTIONAL ASSESSMENT
PAIN_FUNCTIONAL_ASSESSMENT: ACTIVITIES ARE NOT PREVENTED

## 2025-04-30 ASSESSMENT — PAIN SCALES - GENERAL
PAINLEVEL_OUTOF10: 3
PAINLEVEL_OUTOF10: 3
PAINLEVEL_OUTOF10: 7
PAINLEVEL_OUTOF10: 7
PAINLEVEL_OUTOF10: 0
PAINLEVEL_OUTOF10: 4
PAINLEVEL_OUTOF10: 2

## 2025-04-30 ASSESSMENT — PAIN DESCRIPTION - LOCATION
LOCATION: ABDOMEN
LOCATION: ABDOMEN;HEAD

## 2025-04-30 ASSESSMENT — PAIN DESCRIPTION - DESCRIPTORS
DESCRIPTORS: ACHING
DESCRIPTORS: ACHING
DESCRIPTORS: ACHING;BURNING
DESCRIPTORS: ACHING;BURNING

## 2025-04-30 NOTE — PROGRESS NOTES
[address] [address]   Progress Note  [facility] [address]      Date: 2025   Patient: Demi Leal   : 1970   DOA: 2025   MRN: [id number]   ROOM#: 1101/[id number]     Admit Date: 2025     Collaborating Urologist at time of admission: Dr. STOLL  CC: Left renal mass, hernia  S/p laparoscopic partial left nephrectomy and ventral hernia repair 25    Subjective:     Pain: moderate, nausea and vomiting,   Bowel Movement/Flatus: No  Voiding: Indwelling catheter with clear yellow urine     Pt resting in bedside recliner and eating breakfast, reports feeling much better this morning.     Objective:    Vitals:   /71   Pulse 81   Temp 98.5 °F (36.9 °C) (Oral)   Resp 16   Ht 1.689 m (5' 6.5\")   Wt 54.8 kg (120 lb 13 oz)   SpO2 95%   BMI 19.21 kg/m²   Temp  Av.5 °F (36.9 °C)  Min: 98.4 °F (36.9 °C)  Max: 98.7 °F (37.1 °C)    Intake/Output Summary (Last 24 hours) at 2025 0726  Last data filed at 2025 0645  Gross per 24 hour   Intake 1086.26 ml   Output 2075 ml   Net -988.74 ml       Physical Exam:   Gen: Pleasant female, in NAD  Neuro: Non-focal  Resp: Unlabored breathing  Abd: Soft, non-distended, mild diffuse TTP, no rebound. HE drain with serosanguinous output.  Back:  Mild left CVAT  : Indwelling catheter with clear yellow urine  Ext: No edema of bilateral LEs    Labs:  WBC:    Lab Results   Component Value Date/Time    WBC 11.2 2025 10:15 AM     Hemoglobin/Hematocrit:    Lab Results   Component Value Date/Time    HGB 13.0 2025 10:15 AM    HCT 40.1 2025 10:15 AM     BMP:   Lab Results   Component Value Date/Time     2025 10:15 AM    K 3.4 2025 10:15 AM     2025 10:15 AM    CO2 24 2025 10:15 AM    BUN 12 2025 10:15 AM    CREATININE 0.9 2025 10:15 AM    CALCIUM 8.6 2025 10:15 AM    GFRAA >60 2020 05:50 PM    LABGLOM 62 2025 10:15 AM    LABGLOM >60 2023 08:25 AM

## 2025-04-30 NOTE — PROGRESS NOTES
Outpatient Pharmacy Progress Note for Meds-to-Beds    Total number of Prescriptions Filled: 2    Additional Documentation:  Medication(s) were delivered to the patient's room prior to discharge      Thank you for letting us serve your patients.  [address] [address]    Phone: [phone]    Fax: [phone]

## 2025-04-30 NOTE — DISCHARGE SUMMARY
[address] [id number]   Discharge Summary   [facility] [address]  Patient ID:   Demi Leal   [id number]   55 y.o.   1970     Admit date: 4/28/2025     Admission Problem/Diagnosis: Unilateral inguinal hernia without obstruction or gangrene, recurrence not specified [K40.90]  Other specified disorders of kidney and ureter [N28.89]  Left renal mass [N28.89] Principal Problem:    Unilateral inguinal hernia without obstruction or gangrene  Active Problems:    Left renal mass    Other specified disorders of kidney and ureter  Resolved Problems:    * No resolved hospital problems. *      Admitting Physician: Vipin Azar MD     Discharge Diagnoses: Left renal mass  Unilateral inguinal hernia without obstruction or gangrene  Other specified disorders of kidney and ureter     Condition at Discharge: Stable     Discharge date and time: Midday today, 4/30/25     Patient Instructions: Go directly to the ED if you develop chest pain, shortness of breath, fever/chills, or new/worsening symptoms.    Current Discharge Medication List        START taking these medications    Details   bisacodyl (DULCOLAX) 5 MG EC tablet Take 1 tablet by mouth daily as needed for Constipation  Qty: 15 tablet, Refills: 0           CONTINUE these medications which have CHANGED    Details   PERCOCET 5-325 MG per tablet Take 1 tablet by mouth every 6 hours as needed for Pain for up to 8 doses. Intended supply: 3 days. Take lowest dose possible to manage pain Max Daily Amount: 4 tablets  Qty: 8 tablet, Refills: 0    Comments: Reduce doses taken as pain becomes manageable  Associated Diagnoses: Post-op pain           CONTINUE these medications which have NOT CHANGED    Details   CALCIUM PO       pantoprazole (PROTONIX) 40 MG tablet 1 tablet 1/2 to 1 hour before morning meal Orally Once a day for 30 days      Cholecalciferol (VITAMIN D3) 50 MCG (2000 UT) CAPS Take by mouth daily      ondansetron (ZOFRAN-ODT) 4 MG  in your urine  New or worsening symptoms    Lyn last reviewed this educational content on 4/1/2020  © 4913-7496 [employer]. [address] [place] [address], [place] [address]. All rights reserved. This information is not intended as a substitute for professional medical care. Always follow your healthcare professional's instructions.

## 2025-05-01 LAB — SURGICAL PATHOLOGY REPORT: NORMAL

## 2025-05-01 NOTE — OP NOTE
Operative Note      Patient: Demi Leal  YOB: 1970  MRN: [id number]    Date of Procedure: 4/28/2025    Pre-Op Diagnosis Codes:      * Incisional hernia 4 cm     * Other specified disorders of kidney and ureter [N28.89]    Post-Op Diagnosis: Same       Procedure(s):  HERNIA INCISIONAL/VENTRAL REPAIR open without mesh 4 cm   LEFT NEPHRECTOMY PARTIAL ROBOTIC XI    Surgeon(s):  Vipin Aazr MD Andom, Tedros, MD    Assistant:   First Assistant: Mayra Arreguin RN    Anesthesia: General    Estimated Blood Loss (mL): Minimal    Complications: None    Specimens:   ID Type Source Tests Collected by Time Destination   A : LEFT RENAL MASS Tissue Tissue SURGICAL PATHOLOGY Vipin Azar MD 4/28/2025 1701        Implants:  Implant Name Type Inv. Item Serial No.  Lot No. LRB No. Used Action   CLIP INT L POLYMER KATINA LIG HEM O KATINA (6EA/PK) - [id number]  CLIP INT L POLYMER KATINA LIG HEM O KATINA (6EA/PK)  [employer]- [id number] N/A 2 Implanted         Drains:   [REMOVED] Closed/Suction Drain Medial LLQ Bulb (Removed)   Site Description Leaking at site 04/29/25 2115   Dressing Status Old drainage noted 04/30/25 0800   Drainage Appearance Serosanguinous 04/30/25 0800   Drain Status To bulb suction 04/30/25 0800   Output (ml) 30 ml 04/30/25 0645       [REMOVED] Urinary Catheter 04/28/25 (Removed)   Catheter Indications Need for fluid volume management of the critically ill patient in a critical care setting 04/29/25 2115   Site Assessment [place] 04/30/25 0800   Urine Color Yellow 04/30/25 0800   Urine Appearance Clear 04/30/25 0800   Collection Container Standard 04/30/25 0800   Securement Method Securing device (Describe) 04/30/25 0800   Catheter Care  Chlorhexidine Wipes 04/29/25 1039   Catheter Best Practices  Drainage tube clipped to bed;Catheter secured to thigh;Tamper seal intact;Bag below bladder;Bag not on floor;Lack of dependent loop in tubing;Drainage bag less than half full

## 2025-05-02 NOTE — PROGRESS NOTES
Physician Progress Note      PATIENT:               CLIFF CIFUENTES  CSN #:                  [id number]  :                       1970  ADMIT DATE:       2025 9:33 AM  DISCH DATE:        2025 1:55 PM  RESPONDING  PROVIDER #:        SUSANNA PIERRE PA-C          QUERY TEXT:    The surgical pathology of left kidney resulted clear cell renal cell   carcinoma.  Additional clarification is needed as pathology findings are not   reported unless a treating provider indicates their clinical significance.    Based on your professional judgment & clinical indicators below, please select   one of the following options:    The clinical indicators include:  Per pathology note \"Left renal mass; partial nephrectomy:- CLEAR CELL RENAL   CELL CARCINOMA\".  Hernia repair  Robotic nephrectomy  Urology consult, serial labs, supportive care, I&O's  Options provided:  -- Agree with the pathology findings  -- Disagree with the pathology findings  -- Other - I will add my own diagnosis  -- Disagree - Not applicable / Not valid  -- Disagree - Clinically unable to determine / Unknown  -- Refer to Clinical Documentation Reviewer    PROVIDER RESPONSE TEXT:    I agree with the pathology findings.    Query created by: Shanelle Haley on 2025 9:04 AM      Electronically signed by:  SUSANNA PIERRE PA-C 2025 9:39 AM

## 2025-05-15 ENCOUNTER — OFFICE VISIT (OUTPATIENT)
Dept: SURGERY | Age: 55
End: 2025-05-15
Payer: MEDICAID

## 2025-05-15 VITALS
SYSTOLIC BLOOD PRESSURE: 108 MMHG | WEIGHT: 146.9 LBS | BODY MASS INDEX: 23.06 KG/M2 | HEART RATE: 69 BPM | OXYGEN SATURATION: 97 % | DIASTOLIC BLOOD PRESSURE: 70 MMHG | HEIGHT: 67 IN

## 2025-05-15 DIAGNOSIS — K40.90 NON-RECURRENT UNILATERAL INGUINAL HERNIA WITHOUT OBSTRUCTION OR GANGRENE: Primary | ICD-10-CM

## 2025-05-15 PROCEDURE — 99213 OFFICE O/P EST LOW 20 MIN: CPT | Performed by: SURGERY

## 2025-05-15 ASSESSMENT — ENCOUNTER SYMPTOMS
EYE ITCHING: 0
PHOTOPHOBIA: 0
CHOKING: 0
CONSTIPATION: 0
APNEA: 0
ANAL BLEEDING: 0
EYE REDNESS: 0
COLOR CHANGE: 0
STRIDOR: 0
SORE THROAT: 0
BACK PAIN: 0
RECTAL PAIN: 0

## 2025-05-15 NOTE — PROGRESS NOTES
Chief Complaint   Patient presents with    Post-Op Check     1ST PO-NANY INC HERNIA @ [facility] 4/28-DUAL CASE WITH DR JERMAN BERNAL:    History of Present Illness  The patient presents for a postoperative follow-up. I repaired incisional hernia.     She reports an improvement in her condition following the procedure. She had a consultation with Dr. Monsalve on 05/14/2025 and is scheduled to see Dr. Ponce in 2 weeks. Her pathology results indicate clear cell grade 3 and 4, with negative margins. She has been experiencing fluctuations in her potassium levels, which have been causing her discomfort. However, she notes a significant improvement in her symptoms over the past weekend. Her most recent potassium level was 4.5. She continues to wear her brace and is adhering to all recommended precautions. She is aware that she should not engage in any strenuous exercises at this time. She has been advised by Dr. Ponce to avoid lifting anything over 10 pounds. She has been cleared to resume driving as of 05/14/2025. She is requesting a copy of her progress note for her records. She has an upcoming appointment with Dr. Bowens next week.    She also mentions a slight elevation in her platelet count, which she attributes to the recent surgery.    SOCIAL HISTORY  Occupations: Nurse, started own business  Exercise: Walking, elliptical, light weightlifting (5-pound dumbbells), Pilates    I have reviewed the patient's(pertinent information to this visit) medical history, family history(scanned in  the [employer] under \"patient questioner\"), social history and review of systems with the patient today in the office.            Past Surgical History:   Procedure Laterality Date    ABDOMEN SURGERY  01/01/1986    Exploratory Laparotomy    CHOLECYSTECTOMY      COLONOSCOPY N/A 12/28/2023    COLONOSCOPY POLYPECTOMY SNARE/COLD BIOPSY performed by Mikala Bucio MD at [facility] ENDOSCOPY    CYSTOSCOPY Left 09/07/2023    CYSTOSCOPY RETROGRADE

## 2025-05-21 ENCOUNTER — OFFICE VISIT (OUTPATIENT)
Dept: ONCOLOGY | Age: 55
End: 2025-05-21
Payer: MEDICAID

## 2025-05-21 ENCOUNTER — HOSPITAL ENCOUNTER (OUTPATIENT)
Dept: INFUSION THERAPY | Age: 55
Discharge: HOME | End: 2025-05-21
Payer: MEDICAID

## 2025-05-21 VITALS
WEIGHT: 140.8 LBS | OXYGEN SATURATION: 98 % | BODY MASS INDEX: 22.1 KG/M2 | HEIGHT: 67 IN | HEART RATE: 91 BPM | TEMPERATURE: 97.9 F | SYSTOLIC BLOOD PRESSURE: 124 MMHG | DIASTOLIC BLOOD PRESSURE: 92 MMHG

## 2025-05-21 DIAGNOSIS — D72.829 LEUKOCYTOSIS, UNSPECIFIED TYPE: ICD-10-CM

## 2025-05-21 DIAGNOSIS — C64.2 RENAL CELL CARCINOMA OF LEFT KIDNEY (HCC): ICD-10-CM

## 2025-05-21 DIAGNOSIS — N28.89 LEFT RENAL MASS: Primary | ICD-10-CM

## 2025-05-21 LAB
ALBUMIN SERPL-MCNC: 4.4 G/DL (ref 3.4–5)
ALBUMIN/GLOB SERPL: 1.4 {RATIO} (ref 1.1–2.2)
ALP SERPL-CCNC: 115 U/L (ref 40–129)
ALT SERPL-CCNC: 9 U/L (ref 10–40)
ANION GAP SERPL CALCULATED.3IONS-SCNC: 16 MMOL/L (ref 9–17)
AST SERPL-CCNC: 19 U/L (ref 15–37)
BASOPHILS # BLD: 0.04 K/UL
BASOPHILS NFR BLD: 1 % (ref 0–1)
BILIRUB SERPL-MCNC: 0.3 MG/DL (ref 0–1)
BUN SERPL-MCNC: 17 MG/DL (ref 7–20)
CALCIUM SERPL-MCNC: 10.3 MG/DL (ref 8.3–10.6)
CHLORIDE SERPL-SCNC: 99 MMOL/L (ref 99–110)
CO2 SERPL-SCNC: 22 MMOL/L (ref 21–32)
CREAT SERPL-MCNC: 0.9 MG/DL (ref 0.6–1.1)
EOSINOPHIL # BLD: 0.34 K/UL
EOSINOPHILS RELATIVE PERCENT: 5 % (ref 0–3)
ERYTHROCYTE [DISTWIDTH] IN BLOOD BY AUTOMATED COUNT: 14 % (ref 11.7–14.9)
GFR, ESTIMATED: 62 ML/MIN/1.73M2
GLUCOSE SERPL-MCNC: 107 MG/DL (ref 74–99)
HCT VFR BLD AUTO: 46.5 % (ref 37–47)
HGB BLD-MCNC: 14.8 G/DL (ref 12.5–16)
LDH SERPL-CCNC: 174 U/L (ref 100–190)
LYMPHOCYTES NFR BLD: 1.98 K/UL
LYMPHOCYTES RELATIVE PERCENT: 28 % (ref 24–44)
MCH RBC QN AUTO: 28.3 PG (ref 27–31)
MCHC RBC AUTO-ENTMCNC: 31.8 G/DL (ref 32–36)
MCV RBC AUTO: 88.9 FL (ref 78–100)
MONOCYTES NFR BLD: 0.62 K/UL
MONOCYTES NFR BLD: 9 % (ref 0–5)
NEUTROPHILS NFR BLD: 58 % (ref 36–66)
NEUTS SEG NFR BLD: 4.17 K/UL
PLATELET # BLD AUTO: 448 K/UL (ref 140–440)
PMV BLD AUTO: 10.5 FL (ref 7.5–11.1)
POTASSIUM SERPL-SCNC: 4 MMOL/L (ref 3.5–5.1)
PROT SERPL-MCNC: 7.5 G/DL (ref 6.4–8.2)
RBC # BLD AUTO: 5.23 M/UL (ref 4.2–5.4)
SODIUM SERPL-SCNC: 137 MMOL/L (ref 136–145)
WBC OTHER # BLD: 7.2 K/UL (ref 4–10.5)

## 2025-05-21 PROCEDURE — 99212 OFFICE O/P EST SF 10 MIN: CPT

## 2025-05-21 PROCEDURE — 36415 COLL VENOUS BLD VENIPUNCTURE: CPT

## 2025-05-21 PROCEDURE — 99214 OFFICE O/P EST MOD 30 MIN: CPT | Performed by: INTERNAL MEDICINE

## 2025-05-21 PROCEDURE — 80053 COMPREHEN METABOLIC PANEL: CPT

## 2025-05-21 PROCEDURE — 85025 COMPLETE CBC W/AUTO DIFF WBC: CPT

## 2025-05-21 PROCEDURE — 83615 LACTATE (LD) (LDH) ENZYME: CPT

## 2025-05-21 RX ORDER — PROCHLORPERAZINE MALEATE 10 MG
10 TABLET ORAL EVERY 6 HOURS PRN
Qty: 60 TABLET | Refills: 0 | Status: SHIPPED | OUTPATIENT
Start: 2025-05-21 | End: 2025-06-05

## 2025-05-21 NOTE — PROGRESS NOTES
MA Rooming Questions  Patient: Demi Leal  MRN: [id number]    Date: 5/21/2025        1. Do you have any new issues?   yes - Patient states she has been nauseous since before surgery constant dry heaves, patient states she is taking Zofran it is providing mild relief. Also states she is having pain under right rib down to her waistline. Night sweats and chills, along with body odor only in her right axillary. Patient states when she has a bad day she experiences more fatigue and weakness. Patient is also concerned for her liver she states she is not a drinker or a smoker. States bowels are very inconsistent she is either having small bout of fecal matter or it is \"pouring\" out of her. She state incisions are doing well no pain with incisions and states she is healing well. She states when she has an episode of rib pain she feels like the wind is knocked out of her. Not currently taking anything for pain.     2. Do you need any refills on medications?    no    3. Have you had any imaging done since your last visit?   yes - biopsy 4/30    4. Have you been hospitalized or seen in the emergency room since your last visit here?   yes - 4/28-4/30    5. Did the patient have a depression screening completed today? No    No data recorded     PHQ-9 Given to (if applicable):               PHQ-9 Score (if applicable):                     [] Positive     []  Negative              Does question #9 need addressed (if applicable)                     [] Yes    []  No               Elizabeth Santos CMA

## 2025-05-21 NOTE — PROGRESS NOTES
Patient Name:  Demi Leal  Patient :  1970  Patient MRN:  [id number]     Primary Oncologist: Rachael Bowens MD  Referring Provider: Makayla Guerin FNP     Date of Service: 2025      Chief Complaint:    Chief Complaint   Patient presents with    Follow-up    Results         Encounter Diagnosis   Name Primary?    Left renal mass Yes            HPI:   Demi \"Marisa\" Elvis is a pleasant 54 year-old woman presenting alone to the clinic today for an initial consultation for evaluation of kidney mass. Patient has not seen urologist. Has not had an MRI of the abdoment yet. Has seen GI and had an EGF, colonoscopy, as well as small bowel follow through performed. Father with renal cell cancer,  at 67 years. Had surgery performed. Mother  in  from a stroke. Mother diagnosed with oncocytoma of right kidney. No surgery was performed. Son recently graduated and moved back home. Thought her hernia was coming back. Reports nausea, dry heaving. Pain in right upper abdomen and swollen in right area. Reports diarrhea. No problems urinating. No hematuria. Has had blood in stools in the past due to hemorrhoids previously. Left kidney stone in 2023 had stent performed. Reports unintentional weight loss since . Lost about 15 pounds. Reports appetite is well when not in pain. No chest pain, slight SOB when in pain. No fever, no cough. No palpitations. Reports intermittent night sweats. No problems with liver. Slight newly onset headache. No recent vision changes. No focal weakness. Reports ankle and feet LE swelling.    2023 XR CHEST  IMPRESSION:  No acute process.    2023 US CHEST INCLUDING MEDIASTANUM  IMPRESSION:  Degenerative changes of the sternomanubrial joint account for the area of  palpable concern in the midline anterior chest wall.  Of note, no similar  changes were present 2009.    2023 US HEAD NECK SOFT TISSUE THYROID  IMPRESSION:  Unremarkable

## 2025-05-29 ENCOUNTER — OFFICE VISIT (OUTPATIENT)
Dept: SURGERY | Age: 55
End: 2025-05-29
Payer: MEDICAID

## 2025-05-29 VITALS
DIASTOLIC BLOOD PRESSURE: 78 MMHG | SYSTOLIC BLOOD PRESSURE: 120 MMHG | RESPIRATION RATE: 18 BRPM | HEART RATE: 63 BPM | OXYGEN SATURATION: 99 % | BODY MASS INDEX: 23.69 KG/M2 | HEIGHT: 67 IN | WEIGHT: 150.9 LBS

## 2025-05-29 DIAGNOSIS — K43.2 INCISIONAL HERNIA, WITHOUT OBSTRUCTION OR GANGRENE: Primary | ICD-10-CM

## 2025-05-29 LAB — SURGICAL PATHOLOGY REPORT: NORMAL

## 2025-05-29 PROCEDURE — 99213 OFFICE O/P EST LOW 20 MIN: CPT | Performed by: SURGERY

## 2025-06-12 NOTE — PROGRESS NOTES
No chief complaint on file.        SUBJECTIVE:    History of Present Illness  Pt is doing well and back to normal activity. She has followed up with Dr Harding for the partial nephrectomy    I have reviewed the patient's(pertinent information to this visit) medical history, family history(scanned in  the [employer] under \"patient questioner\"), social history and review of systems with the patient today in the office.            Past Surgical History:   Procedure Laterality Date    ABDOMEN SURGERY  01/01/1986    Exploratory Laparotomy    CHOLECYSTECTOMY      COLONOSCOPY N/A 12/28/2023    COLONOSCOPY POLYPECTOMY SNARE/COLD BIOPSY performed by Mikala Bucio MD at [facility] ENDOSCOPY    CYSTOSCOPY Left 09/07/2023    CYSTOSCOPY RETROGRADE PYLEOGRAMS STONE MANIPULATION WITH STENT PLACEMENT performed by Ramón Jackson MD at [facility] OR    DENTAL SURGERY  01/01/1978    Caps On Teeth    DILATION AND CURETTAGE OF UTERUS      X 4 in Past, Last One 2007    HYSTERECTOMY, TOTAL ABDOMINAL (CERVIX REMOVED)  08/01/2011    Robotic LAVH BSO by Dr Churchill    LAPAROSCOPY      X3 Last one 2007, laser     OVARY REMOVAL      PARTIAL NEPHRECTOMY Left 4/28/2025    LEFT NEPHRECTOMY PARTIAL ROBOTIC XI performed by Vipin Azar MD at [facility] OR    TONSILLECTOMY  01/01/1977    T & A    UMBILICAL HERNIA REPAIR  2010?    Dr Olmedo    UPPER GASTROINTESTINAL ENDOSCOPY N/A 04/25/2024    ESOPHAGOGASTRODUODENOSCOPY BIOPSY performed by Odette Otoole MD at [facility] ENDOSCOPY    VENTRAL HERNIA REPAIR N/A 4/28/2025    HERNIA INCISIONAL/VENTRAL REPAIR LAPAROSCOPIC ROBOTIC XI performed by Arden Mccarty MD at [facility] OR     Past Medical History:   Diagnosis Date    Anxiety 2020    Was taking care of mother and ex  in 2019 and it started taking a toll on me. Currently on lowest dose of Prozac 10mg but weening off    Cancer (HCC) 3/1/25    Suspicious for RCC/ Lt kidney Ct Scan/ [facility] in [place]    Cerebral artery occlusion with cerebral infarction (HCC) 2020    NO

## 2025-06-16 ASSESSMENT — ENCOUNTER SYMPTOMS
COLOR CHANGE: 0
ANAL BLEEDING: 0
BACK PAIN: 0
RECTAL PAIN: 0
SORE THROAT: 0
PHOTOPHOBIA: 0
EYE ITCHING: 0
CONSTIPATION: 0
APNEA: 0
EYE REDNESS: 0
STRIDOR: 0
CHOKING: 0

## 2025-07-18 ENCOUNTER — TELEPHONE (OUTPATIENT)
Dept: ONCOLOGY | Age: 55
End: 2025-07-18

## 2025-07-18 NOTE — TELEPHONE ENCOUNTER
Pt called back from our call this morning about cancelling her appt on 7/21/25 with Dr Bowens. Pt does not want to r/s at this time. Pt has lost her insurance and is currently working to save money to purchase her own insurance.

## 2025-07-18 NOTE — TELEPHONE ENCOUNTER
Pt sent in [employer] message to cx appt on 7/21/25. I messaged back to see if she would like to r/s and I tried to call but could not leave voicemail on 7/18/25.

## 2025-07-24 LAB — FLOW CYTOMETRY: NORMAL

## (undated) PROCEDURE — 0WQF3ZZ REPAIR ABDOMINAL WALL, PERCUTANEOUS APPROACH: ICD-10-PCS

## (undated) PROCEDURE — 8E0W4CZ ROBOTIC ASSISTED PROCEDURE OF TRUNK REGION, PERCUTANEOUS ENDOSCOPIC APPROACH: ICD-10-PCS

## (undated) PROCEDURE — 0TT14ZZ RESECTION OF LEFT KIDNEY, PERCUTANEOUS ENDOSCOPIC APPROACH: ICD-10-PCS

## (undated) DEVICE — Device

## (undated) DEVICE — APPLICATOR ENDOSCP L34CM W/ S STL CANN PLAS OBT STYL FOR

## (undated) DEVICE — SNARE VASC L240CM LOOP W10MM SHTH DIA2.4MM RND STIFF CLD

## (undated) DEVICE — SUTURE V-LOC 180 SZ 3-0 L9IN ABSRB GRN L26MM V-20 1/2 CIR VLOCL0644

## (undated) DEVICE — CATHETER URET 5FR L70CM OPN END SGL LUMN INJ HUB FLEXIMA

## (undated) DEVICE — SUTURE ABSORBABLE BRAIDED 2-0 CT-1 27 IN UD VICRYL J259H

## (undated) DEVICE — BLADELESS OBTURATOR, LONG: Brand: WECK VISTA

## (undated) DEVICE — Z DISCONTINUED USE 2220190 SUTURE VICRYL SZ 3-0 L27IN ABSRB UD L26MM SH 1/2 CIR J416H

## (undated) DEVICE — GOWN,SIRUS,POLYRNF,BRTHSLV,XLN/XL,20/CS: Brand: MEDLINE

## (undated) DEVICE — TIP COVER ACCESSORY

## (undated) DEVICE — TUBING, SUCTION, 9/32" X 10', STRAIGHT: Brand: MEDLINE

## (undated) DEVICE — UROLOGIC DRAIN BAG: Brand: UNBRANDED

## (undated) DEVICE — Z INACTIVE USE 2863041 SPONGE GZ W4XL4IN 100% COT 16 PLY RADPQ HIGHLY ABSRB

## (undated) DEVICE — AGENT HEMSTAT 3GM OXIDIZED REGENERATED CELOS ABSRB FOR CONT (ORDER MULTIPLES OF 5EA)

## (undated) DEVICE — DRESSING TRNSPAR W2XL2.75IN FLM SHT SEMIPERMEABLE WIND

## (undated) DEVICE — GLOVE ORANGE PI 7   MSG9070

## (undated) DEVICE — PACK,BASIC,IX: Brand: MEDLINE

## (undated) DEVICE — CONTAINER,SPECIMEN,4OZ,OR STRL: Brand: MEDLINE

## (undated) DEVICE — COUNTER NDL 30 COUNT FOAM STRP SGL MAG

## (undated) DEVICE — BLANKET WRM W40.2XL55.9IN IORT LO BODY + MISTRAL AIR

## (undated) DEVICE — SPONGE LAP W18XL18IN WHT COT 4 PLY FLD STRUNG RADPQ DISP ST 2 PER PACK

## (undated) DEVICE — POUCH SPEC RETRV SHFT 15MM 13X23CM GRN POLYUR DBL RNG HNDL

## (undated) DEVICE — SUTURE STRATAFIX SYMMETRIC SZ 1 L18IN ABSRB VLT CT1 L36CM SXPP1A404

## (undated) DEVICE — FORCEPS BX L240CM JAW DIA2.8MM L CAP W/ NDL MIC MESH TOOTH

## (undated) DEVICE — Z DISCONTINUED USE 2764362 SEAL ENDOSCP INSTR DIA5-8MM UNIV FOR CANN DA VINCI XI

## (undated) DEVICE — Z DISCONTINUED USE 2220193 SUTURE VCRL SZ 4-0 L27IN ABSRB UD L19MM FS-2 3/8 CIR REV J422H

## (undated) DEVICE — SUTURE STRATAFIX SZ 3-0 L20CM ABSRB VLT NDL SH-1 L22MM 1/2 SXPP1B453

## (undated) DEVICE — TROCAR ENDOSCP L150 MM DIA15MM THRD FOR KII OPT ACCS SYS COR39] APPLIED MEDICAL RESOURCES]

## (undated) DEVICE — ADHESIVE SKIN CLSR 0.7ML TOP DERMBND ADV

## (undated) DEVICE — TUBING FLTR PLUME AWAY EVAC W/ SUCT DEV DISP PUREVIEW

## (undated) DEVICE — GLOVE SURG SZ 6 THK91MIL LTX FREE SYN POLYISOPRENE ANTI

## (undated) DEVICE — NEEDLE HYPO 20GA L1.5IN YEL POLYPR HUB S STL REG BVL STR

## (undated) DEVICE — Z INACTIVE NO ACTIVE SUPPLIER APPLICATOR MEDICATED 26 CC TINT HI-LITE ORNG STRL CHLORAPREP

## (undated) DEVICE — STAPLER SKIN POWERED L320MM 35MM VASC TISS 12 FIRING B FRM

## (undated) DEVICE — GLOVE SURG SZ 75 CRM LTX FREE POLYISOPRENE POLYMER BEAD ANTI

## (undated) DEVICE — SYRINGE MED 20ML STD CLR PLAS LUERLOCK TIP N CTRL DISP

## (undated) DEVICE — 3M™ IOBAN™ 2 ANTIMICROBIAL INCISE DRAPE 6650EZ: Brand: IOBAN™ 2

## (undated) DEVICE — PROTECTOR EYE PT SELF ADH NS OPT GRD LF

## (undated) DEVICE — SET TBNG DISP TIP FOR AHTO

## (undated) DEVICE — POSITIONER,HEAD,RING CUSHION,9IN,32CS: Brand: MEDLINE

## (undated) DEVICE — EXCEL 10FT (3.05 M) INSUFFLATION TUBING SET WITH 0.1 MICRON FILTER: Brand: EXCEL

## (undated) DEVICE — TROCAR: Brand: KII FIOS FIRST ENTRY

## (undated) DEVICE — PREMIUM WET SKIN PREP TRAY: Brand: MEDLINE INDUSTRIES, INC.

## (undated) DEVICE — PAD,ABDOMINAL,5"X9",ST,LF,25/BX: Brand: MEDLINE INDUSTRIES, INC.

## (undated) DEVICE — SPONGE GZ W4XL8IN COT WVN 12 PLY

## (undated) DEVICE — ENDOSCOPY KIT: Brand: MEDLINE INDUSTRIES, INC.

## (undated) DEVICE — DRAPE,ABDOMINAL,MAJOR,STERILE: Brand: MEDLINE

## (undated) DEVICE — APPLICATOR LAP 45CM FLX 2 VISTASEAL

## (undated) DEVICE — GLOVE ORTHO 8   MSG9480

## (undated) DEVICE — SYRINGE MED BLNT 18 GAX15 IN 10 CC W/ NDL FILL LUERLOCK TIP

## (undated) DEVICE — Z DUP USE 2641840 CLIP INT L POLYMER LOK LIG HEM O LOK

## (undated) DEVICE — POSITIONER HD AD W4.5XH8XL9IN HIGHLY RESILIENT FOAM CMFRT

## (undated) DEVICE — PACK,CYSTOSCOPY,PK I,AURORA: Brand: MEDLINE

## (undated) DEVICE — SEALANT TISS 10 ML FIBRIN VISTASEAL

## (undated) DEVICE — SUTURE VICRYL SZ 3-0 L27IN ABSRB UD L36MM CT-1 1/2 CIR J258H

## (undated) DEVICE — FIRST ENTRY KIOS THRD 5X100MM ST

## (undated) DEVICE — SUTURE NONABSORBABLE MONOFILAMENT 4-0 RB-1 36 IN BLU PROLENE 8557H

## (undated) DEVICE — TOWEL,OR,DSP,ST,BLUE,STD,6/PK,12PK/CS: Brand: MEDLINE

## (undated) DEVICE — BLADELESS OBTURATOR: Brand: WECK VISTA

## (undated) DEVICE — ARM DRAPE

## (undated) DEVICE — INTENDED FOR TISSUE SEPARATION, AND OTHER PROCEDURES THAT REQUIRE A SHARP SURGICAL BLADE TO PUNCTURE OR CUT.: Brand: BARD-PARKER ® STAINLESS STEEL BLADES

## (undated) DEVICE — AIRSEAL 12 MM ACCESS PORT AND PALM GRIP OBTURATOR WITH BLADELESS OPTICAL TIP, 120 MM LENGTH: Brand: AIRSEAL

## (undated) DEVICE — STAPLER SKIN H3.9MM WIRE DIA0.58MM CRWN 6.9MM 35 STPL ROT

## (undated) DEVICE — Z DISCONTINUED USE 2874146 AGENT HEMSTAT W2XL14IN OXIDIZED REGENERATED CELOS ABSRB FOR

## (undated) DEVICE — APPLIER SUT CLP FOR 2-0 3-0 4-0 VCRL ABSRB SUT

## (undated) DEVICE — APPLIER CLP L33CM SHFT DIA11MM 8 SUT ABSRB RELD REUSE LAPRA

## (undated) DEVICE — STRIP SKIN CLSR W0.25XL4IN WHT SPUNBOUND FBR NYL HI ADH

## (undated) DEVICE — TIP APPL L45CM FLX FOR SEAL EVICEL

## (undated) DEVICE — DRAPE SHEET ULTRAGARD: Brand: MEDLINE

## (undated) DEVICE — SUTURE PDS II SZ 2-0 L27IN ABSRB VLT L26MM CT-2 1/2 CIR Z333H

## (undated) DEVICE — PENCIL ES CRD L10FT HND SWCHING ROCK SWCH W/ EDGE COAT BLDE

## (undated) DEVICE — AGENT HEMOSTATIC SURGIFLOW MATRIX KIT W/THROMBIN

## (undated) DEVICE — YANKAUER,FLEXIBLE HANDLE,REGLR CAPACITY: Brand: MEDLINE INDUSTRIES, INC.

## (undated) DEVICE — SUTURE VICRYL SZ 4-0 L18IN ABSRB UD L19MM PS-2 3/8 CIR PRIM J496H

## (undated) DEVICE — SUTURE PDS II SZ 1 L96IN ABSRB VLT TP-1 L65MM 1/2 CIR Z880G

## (undated) DEVICE — SET IRRIG L94IN ID0.281IN W/ 4.5IN DST FLX CONN 2 LD ON OFF

## (undated) DEVICE — JELLY,LUBE,STERILE,FLIP TOP,TUBE,2-OZ: Brand: MEDLINE

## (undated) DEVICE — Z INACTIVE USE 2660663 SOLUTION IRRIG 1000ML STRIL H2O USP PLAS POUR BTL

## (undated) DEVICE — COLUMN DRAPE

## (undated) DEVICE — GOWN,ECLIPSE,POLYRNF,BRTHSLV,L,30/CS: Brand: MEDLINE

## (undated) DEVICE — Z DISCONTINUED USE 2220241 SUTURE SZ 0 27IN 5/8 CIR UR-6  TAPER PT VIOLET ABSRB VICRYL J603H

## (undated) DEVICE — ELECTRODE ES AD CRDLSS PT RET REM POLYHESIVE

## (undated) DEVICE — SEALANT HEMSTAT 5ML HUM FIBRIN THROM 2 VI APPL DEV EVICEL

## (undated) DEVICE — TOTAL TRAY, 16FR 10ML SIL FOLEY, URN: Brand: MEDLINE

## (undated) DEVICE — NITINOL STONE RETRIEVAL BASKET: Brand: ZERO TIP

## (undated) DEVICE — SUTURE VICRYL SZ 1 L27IN ABSRB VLT L26MM CT-2 1/2 CIR J335H

## (undated) DEVICE — OBTURATOR ROBOTIC DIA8MM BLDELSS ENDOSCP DISP DA VINCI SI

## (undated) DEVICE — GUIDEWIRE ENDOSCP L150CM DIA0.035IN TIP 3CM PTFE NIT

## (undated) DEVICE — LINER,SEMI-RIGID,3000CC,50EA/CS: Brand: MEDLINE

## (undated) DEVICE — GLOVE SURG SZ 7 CRM LTX FREE POLYISOPRENE POLYMER BEAD ANTI

## (undated) DEVICE — SOLUTION IV 1000ML 0.9% SOD CHL FOR IRRIG PLAS CONT

## (undated) DEVICE — ENDOPATH ECHELON VASCULAR  RELOADS, WHITE, 35MM: Brand: ECHELON ENDOPATH

## (undated) DEVICE — GLOVE ORANGE PI 7 1/2   MSG9075

## (undated) DEVICE — DRESSING TRNSPAR W6XL8IN FLM SURESITE 123

## (undated) DEVICE — TOTAL TRAY, DB, 100% SILI FOLEY, 16FR 10: Brand: MEDLINE